# Patient Record
Sex: MALE | Race: BLACK OR AFRICAN AMERICAN | Employment: OTHER | URBAN - METROPOLITAN AREA
[De-identification: names, ages, dates, MRNs, and addresses within clinical notes are randomized per-mention and may not be internally consistent; named-entity substitution may affect disease eponyms.]

---

## 2019-01-30 ENCOUNTER — HOSPITAL ENCOUNTER (OUTPATIENT)
Dept: ULTRASOUND IMAGING | Age: 79
Discharge: HOME | End: 2019-01-30
Attending: FAMILY MEDICINE
Payer: MEDICARE

## 2019-01-30 DIAGNOSIS — N50.89 SCROTAL MASS: ICD-10-CM

## 2019-01-30 PROCEDURE — 76870 US EXAM SCROTUM: CPT

## 2021-10-04 RX ORDER — ACETAMINOPHEN 325 MG/1
650 TABLET ORAL ONCE
Status: ACTIVE | OUTPATIENT
Start: 2021-10-08 | End: 2021-10-08

## 2021-10-04 RX ORDER — DIPHENHYDRAMINE HCL 25 MG
25 CAPSULE ORAL ONCE
Status: ACTIVE | OUTPATIENT
Start: 2021-10-08 | End: 2021-10-08

## 2021-10-04 RX ORDER — SODIUM CHLORIDE 9 MG/ML
25 INJECTION, SOLUTION INTRAVENOUS CONTINUOUS
Status: DISPENSED | OUTPATIENT
Start: 2021-10-08 | End: 2021-10-08

## 2021-10-07 ENCOUNTER — APPOINTMENT (OUTPATIENT)
Dept: INFUSION THERAPY | Age: 81
End: 2021-10-07

## 2021-10-08 ENCOUNTER — HOSPITAL ENCOUNTER (OUTPATIENT)
Dept: INFUSION THERAPY | Age: 81
Discharge: HOME | End: 2021-10-08

## 2022-04-25 RX ORDER — DIPHENHYDRAMINE HCL 25 MG
25 CAPSULE ORAL ONCE
Status: DISCONTINUED | OUTPATIENT
Start: 2022-04-27 | End: 2022-04-27

## 2022-04-25 RX ORDER — ACETAMINOPHEN 325 MG/1
650 TABLET ORAL ONCE
Status: DISCONTINUED | OUTPATIENT
Start: 2022-04-27 | End: 2022-04-27

## 2022-04-27 ENCOUNTER — HOSPITAL ENCOUNTER (OUTPATIENT)
Dept: INFUSION THERAPY | Age: 82
Discharge: HOME | End: 2022-04-27
Payer: MEDICARE

## 2022-04-27 VITALS
TEMPERATURE: 97.8 F | HEART RATE: 87 BPM | DIASTOLIC BLOOD PRESSURE: 76 MMHG | OXYGEN SATURATION: 97 % | SYSTOLIC BLOOD PRESSURE: 146 MMHG | RESPIRATION RATE: 18 BRPM

## 2022-04-27 LAB — HISTORY CHECKED?,CKHIST: NORMAL

## 2022-04-27 PROCEDURE — 86923 COMPATIBILITY TEST ELECTRIC: CPT

## 2022-04-27 PROCEDURE — 86900 BLOOD TYPING SEROLOGIC ABO: CPT

## 2022-04-27 PROCEDURE — 36415 COLL VENOUS BLD VENIPUNCTURE: CPT

## 2022-04-27 RX ORDER — DIPHENHYDRAMINE HCL 25 MG
25 CAPSULE ORAL ONCE
Status: COMPLETED | OUTPATIENT
Start: 2022-04-29 | End: 2022-04-29

## 2022-04-27 RX ORDER — ACETAMINOPHEN 325 MG/1
650 TABLET ORAL ONCE
Status: COMPLETED | OUTPATIENT
Start: 2022-04-29 | End: 2022-04-29

## 2022-04-27 NOTE — PROGRESS NOTES
[facility] Progress Note    Date: 2022    Name: Geoffrey Perez    MRN: [id number]         : 1940      1035:  Pt arrived ambulatory and in no distress, for lab visit (Type and cross). Labs drawn via RAC, patient tolerated well. The patient was asked the following questions and answered as documented below:    1. Do you have any symptoms of COVID-19? SOB, coughing, fever, or generally not feeling well? NO  2. Have you been exposed to COVID-19 recently? NO  3. Have you had any recent contact with family/friend that has a pending COVID test? NO      Follow Up: Proceed with treatment    Visit Vitals  BP (!) 146/76 (BP 1 Location: Right arm, BP Patient Position: Sitting)   Pulse 87   Temp 97.8 °F (36.6 °C)   Resp 18   SpO2 97%       1055: Departed O[facility] ambulatory and in no distress.     Future Appointments   Date Time Provider Devon Abraham   2022 12:30 PM [facility] INFUSION NURSE 1 [address] [facility] SUSAN   2022 11:00 AM [facility] INFUSION NURSE 1 [address] [place] [address]       Mendel Coventry, RN  2022

## 2022-04-29 ENCOUNTER — HOSPITAL ENCOUNTER (OUTPATIENT)
Dept: INFUSION THERAPY | Age: 82
Discharge: HOME | End: 2022-04-29
Payer: MEDICARE

## 2022-04-29 VITALS
RESPIRATION RATE: 16 BRPM | HEART RATE: 81 BPM | SYSTOLIC BLOOD PRESSURE: 122 MMHG | HEIGHT: 66 IN | OXYGEN SATURATION: 100 % | WEIGHT: 204 LBS | BODY MASS INDEX: 32.78 KG/M2 | TEMPERATURE: 97.2 F | DIASTOLIC BLOOD PRESSURE: 50 MMHG

## 2022-04-29 PROCEDURE — P9016 RBC LEUKOCYTES REDUCED: HCPCS

## 2022-04-29 PROCEDURE — 36430 TRANSFUSION BLD/BLD COMPNT: CPT

## 2022-04-29 PROCEDURE — 74011250637 HC RX REV CODE- 250/637: Performed by: INTERNAL MEDICINE

## 2022-04-29 PROCEDURE — 74011250636 HC RX REV CODE- 250/636: Performed by: INTERNAL MEDICINE

## 2022-04-29 PROCEDURE — 77030013169 SET IV BLD ICUM -A

## 2022-04-29 PROCEDURE — 74011000250 HC RX REV CODE- 250: Performed by: INTERNAL MEDICINE

## 2022-04-29 RX ORDER — SODIUM CHLORIDE 0.9 % (FLUSH) 0.9 %
5-10 SYRINGE (ML) INJECTION AS NEEDED
Status: DISCONTINUED | OUTPATIENT
Start: 2022-04-29 | End: 2022-04-30 | Stop reason: HOSPADM

## 2022-04-29 RX ORDER — SODIUM CHLORIDE 9 MG/ML
250 INJECTION, SOLUTION INTRAVENOUS AS NEEDED
Status: DISCONTINUED | OUTPATIENT
Start: 2022-04-29 | End: 2022-05-01 | Stop reason: HOSPADM

## 2022-04-29 RX ADMIN — Medication 10 ML: at 14:12

## 2022-04-29 RX ADMIN — DIPHENHYDRAMINE HYDROCHLORIDE 25 MG: 25 CAPSULE ORAL at 11:10

## 2022-04-29 RX ADMIN — ACETAMINOPHEN 650 MG: 325 TABLET ORAL at 11:10

## 2022-04-29 RX ADMIN — Medication 10 ML: at 11:06

## 2022-04-29 RX ADMIN — SODIUM CHLORIDE 250 ML: 9 INJECTION, SOLUTION INTRAVENOUS at 11:08

## 2022-04-29 NOTE — DISCHARGE INSTRUCTIONS
OUTPATIENT INFUSION CENTER    DISCHARGE INSTRUCTIONS FOR:  BLOOD TRANSFUSION    We hope you are feeling better after your blood transfusion. Some mild tenderness or slight bruising at your IV site is normal.  Avoid lifting or heavy use of that extremity for the rest of the day. Drink plenty of fluids, eat a normal diet and get some rest.    There are some important signs that you need to watch for in case you experience a delayed reaction to the blood you have received. Call your physician immediately if you develop any of the following symptoms:    1. Severe headache or backache;    2. Fever above 100 degrees;    3. Chills;    4. Difficulty breathing;    5.  Blood or red color in urine;    6. The feeling of weakness or constant fatigue;    7. Yellowing of the whites of your eyes or skin (jaundice). If your physician is not available, call or go to the nearest emergency room, or dial 911.     Anna Marie Pablo, Signature: ___________________________ 4/29/2022  Lisa Lofton RN

## 2022-04-29 NOTE — PROGRESS NOTES
[facility] Progress Note  April 29, 2022      [address] arrived ambulatory and in no acute distress for 1 unit PRBCs. Patient COVID Screening completed:   Do you have any symptoms of COVID-19? SOB, coughing, fever, or generally not feeling well? NO   Have you been exposed to COVID-19 recently? NO   Have you had any recent contact with family/friend that has a pending COVID test? NO    Assessment completed, no new complaints voiced. 22G PIV established in R A/C without difficulty. Education provided regarding reason for blood transfusion and possible reactions. All questions and concerns regarding transfusion answered, patient voiced understanding. Problem: Anemia Care Plan (Adult and Pediatric)  Goal: *Labs within defined limits  Outcome: Progressing Towards Goal     Problem: Knowledge Deficit  Goal: *Verbalizes understanding of procedures and medications  Outcome: Progressing Towards Goal     Problem: Patient Education:  Go to Education Activity  Goal: Patient/Family Education  Outcome: Progressing Towards Goal      Medications received:  NS @ KVO  Tylenol 650 mg PO  Benadryl 25 mg PO    1130:  1st unit PRBCs started and infusing without difficulty, observed x 15 minutes  1350:  1st unit completed without adverse reaction noted, NS flushing line. Patient Vitals for the past 12 hrs:   Temp Pulse Resp BP SpO2   04/29/22 1410 97.2 °F (36.2 °C) 81 16 (!) 122/50    04/29/22 1330 97.3 °F (36.3 °C) 83 16 (!) 124/51    04/29/22 1230 97.2 °F (36.2 °C) 79 16 (!) 119/52    04/29/22 1200 97.1 °F (36.2 °C) 79 16 (!) 118/50    04/29/22 1145 97.3 °F (36.3 °C) (!) 52 18 (!) 111/35 100 %   04/29/22 1125 97.1 °F (36.2 °C) 81 18 (!) 123/56 98 %   04/29/22 1050 97 °F (36.1 °C) 85 18 (!) 119/58 100 %       1415 Patient tolerated transfusion  well, no adverse reaction noted. D/C instructions reviewed, copy to pt, voiced understanding. Patient declined 1 hour post transfusion observation/vitals signs. PIV flushed and removed, 2x2 and coban placed. D/Cd from [facility] ambulatory and in no distress accompanied by self. No future appointments.     Tashi Grier RN  April 29, 2022

## 2022-05-01 LAB
ABO + RH BLD: NORMAL
BLD PROD TYP BPU: NORMAL
BLOOD GROUP ANTIBODIES SERPL: NORMAL
BPU ID: NORMAL
CROSSMATCH RESULT,%XM: NORMAL
SPECIMEN EXP DATE BLD: NORMAL
STATUS OF UNIT,%ST: NORMAL
UNIT DIVISION, %UDIV: 0

## 2022-06-13 ENCOUNTER — HOSPITAL ENCOUNTER (OUTPATIENT)
Dept: INFUSION THERAPY | Age: 82
Discharge: HOME | End: 2022-06-13
Payer: MEDICARE

## 2022-06-13 VITALS
TEMPERATURE: 98 F | RESPIRATION RATE: 18 BRPM | DIASTOLIC BLOOD PRESSURE: 63 MMHG | HEART RATE: 84 BPM | SYSTOLIC BLOOD PRESSURE: 119 MMHG

## 2022-06-13 LAB — HISTORY CHECKED?,CKHIST: NORMAL

## 2022-06-13 PROCEDURE — 86920 COMPATIBILITY TEST SPIN: CPT

## 2022-06-13 PROCEDURE — 36415 COLL VENOUS BLD VENIPUNCTURE: CPT

## 2022-06-13 PROCEDURE — 86900 BLOOD TYPING SEROLOGIC ABO: CPT

## 2022-06-13 RX ORDER — SODIUM CHLORIDE 9 MG/ML
250 INJECTION, SOLUTION INTRAVENOUS AS NEEDED
Status: DISCONTINUED | OUTPATIENT
Start: 2022-06-13 | End: 2022-06-14 | Stop reason: HOSPADM

## 2022-06-13 NOTE — PROGRESS NOTES
[address] [facility] [address] @ [facility] VISIT NOTE    1500 Patient arrives for Type & Cross without acute problems. Please see connect care for complete assessment and education provided. Vital signs stable throughout and prior to discharge, Pt. Tolerated treatment well and discharged without incident. Patient is aware of next [facility] appointment on 6/14/2022. Appointment card given to patient. VITAL SIGNS Patient Vitals for the past 12 hrs:   Temp Pulse Resp BP   06/13/22 1502 98 °F (36.7 °C) 84 18 119/63       LAB WORK Lab results pending, please see Connect Care for results. Recent Results (from the past 12 hour(s))   RBC, ALLOCATE    Collection Time: 06/13/22  3:00 PM   Result Value Ref Range    HISTORY CHECKED?  Historical check performed

## 2022-06-14 ENCOUNTER — HOSPITAL ENCOUNTER (OUTPATIENT)
Dept: INFUSION THERAPY | Age: 82
Discharge: HOME | End: 2022-06-14
Payer: MEDICARE

## 2022-06-14 VITALS
HEART RATE: 73 BPM | SYSTOLIC BLOOD PRESSURE: 110 MMHG | RESPIRATION RATE: 18 BRPM | TEMPERATURE: 97.7 F | DIASTOLIC BLOOD PRESSURE: 59 MMHG

## 2022-06-14 PROCEDURE — 74011250636 HC RX REV CODE- 250/636: Performed by: NURSE PRACTITIONER

## 2022-06-14 PROCEDURE — P9016 RBC LEUKOCYTES REDUCED: HCPCS

## 2022-06-14 PROCEDURE — 86922 COMPATIBILITY TEST ANTIGLOB: CPT

## 2022-06-14 PROCEDURE — 36430 TRANSFUSION BLD/BLD COMPNT: CPT

## 2022-06-14 PROCEDURE — 86921 COMPATIBILITY TEST INCUBATE: CPT

## 2022-06-14 PROCEDURE — 77030013169 SET IV BLD ICUM -A

## 2022-06-14 PROCEDURE — P9073 PLATELETS PHERESIS PATH REDU: HCPCS

## 2022-06-14 PROCEDURE — 74011250637 HC RX REV CODE- 250/637: Performed by: NURSE PRACTITIONER

## 2022-06-14 RX ORDER — AMOXICILLIN 250 MG
1 CAPSULE ORAL 2 TIMES DAILY
COMMUNITY

## 2022-06-14 RX ORDER — POLYETHYLENE GLYCOL 3350 17 G/17G
17 POWDER, FOR SOLUTION ORAL 2 TIMES DAILY
COMMUNITY

## 2022-06-14 RX ORDER — ACETAMINOPHEN 325 MG/1
650 TABLET ORAL ONCE
Status: COMPLETED | OUTPATIENT
Start: 2022-06-14 | End: 2022-06-14

## 2022-06-14 RX ORDER — SODIUM CHLORIDE 9 MG/ML
250 INJECTION, SOLUTION INTRAVENOUS AS NEEDED
Status: DISCONTINUED | OUTPATIENT
Start: 2022-06-14 | End: 2022-06-15 | Stop reason: HOSPADM

## 2022-06-14 RX ORDER — DIPHENHYDRAMINE HCL 25 MG
25 CAPSULE ORAL ONCE
Status: COMPLETED | OUTPATIENT
Start: 2022-06-14 | End: 2022-06-14

## 2022-06-14 RX ORDER — SODIUM CHLORIDE 9 MG/ML
25 INJECTION, SOLUTION INTRAVENOUS CONTINUOUS
Status: DISCONTINUED | OUTPATIENT
Start: 2022-06-14 | End: 2022-06-15 | Stop reason: HOSPADM

## 2022-06-14 RX ADMIN — DIPHENHYDRAMINE HYDROCHLORIDE 25 MG: 25 CAPSULE ORAL at 09:04

## 2022-06-14 RX ADMIN — ACETAMINOPHEN 650 MG: 325 TABLET ORAL at 09:04

## 2022-06-14 RX ADMIN — SODIUM CHLORIDE 25 ML/HR: 900 INJECTION, SOLUTION INTRAVENOUS at 09:00

## 2022-06-14 NOTE — PROGRESS NOTES
[facility] Peds/Adult Note                       Date: 2022    Name: Erin Schafer    MRN: [id number]         : 1940    0840 Patient arrives for Platelets and PRBCs transfusion without acute problems. Please see [facility] for complete assessment and education provided. Prior to treatment, patient was screened for COVID 19. Patient denies any signs or symptoms of COVID. Denies any known physical contact with anyone diagnosed with, or with pending or positive COVID test. Denies a pending or positive COVID test himself. Vital signs stable throughout and prior to discharge. Patient tolerated procedure well and was discharged without incident. Patient is aware of no further [facility] appointments and to follow up with referring provider for any questions or concerns. Mr. Haylee Barbosa vitals were reviewed prior to and after treatment. Patient Vitals for the past 12 hrs:   Temp Pulse Resp BP   22 1341 97.7 °F (36.5 °C) 73 18 (!) 110/59   22 1115 98 °F (36.7 °C) 80 18 (!) 109/54   22 1053 97.5 °F (36.4 °C) 83 18 (!) 108/59   22 1044 98.4 °F (36.9 °C) 80 18 (!) 101/55   22 1020 97.5 °F (36.4 °C) 81 18 (!) 100/53   22 0950 97.7 °F (36.5 °C) 85 16 123/76   22 0839 98 °F (36.7 °C) 96 18 115/65         Lab results were obtained and reviewed. No results found for this or any previous visit (from the past 12 hour(s)).     Medications given:   Medications Administered     0.9% sodium chloride infusion     Admin Date  2022 Action  New Bag Dose  25 mL/hr Rate  25 mL/hr Route  IntraVENous Administered By  Gurpreet Maurer RN          acetaminophen (TYLENOL) tablet 650 mg     Admin Date  2022 Action  Given Dose  650 mg Route  Oral Administered By  Gurpreet Maurer RN          diphenhydrAMINE (BENADRYL) capsule 25 mg     Admin Date  2022 Action  Given Dose  25 mg Route  Oral Administered By  Gurpreet Maurer RN              Patient was given 1 unit of platelets and one unit of packed red blood cells per protocol. Mr. Anna Haynes tolerated the transfusions, and had no complaints. Mr. Anna Haynes was discharged from [place] [address] in stable condition. Discharge Instructions provided to patient, patient verbalized understanding and was given a copy of d/c instructions. No future appointments.     Jud Kowalski RN  June 14, 2022  3:32 PM

## 2022-06-15 LAB
ABO + RH BLD: NORMAL
BLD PROD TYP BPU: NORMAL
BLD PROD TYP BPU: NORMAL
BLOOD GROUP ANTIBODIES SERPL: NORMAL
BPU ID: NORMAL
BPU ID: NORMAL
CROSSMATCH RESULT,%XM: NORMAL
SPECIMEN EXP DATE BLD: NORMAL
STATUS OF UNIT,%ST: NORMAL
STATUS OF UNIT,%ST: NORMAL
UNIT DIVISION, %UDIV: 0
UNIT DIVISION, %UDIV: 0

## 2022-07-08 ENCOUNTER — HOSPITAL ENCOUNTER (OUTPATIENT)
Dept: INFUSION THERAPY | Age: 82
Discharge: HOME | End: 2022-07-08
Payer: MEDICARE

## 2022-07-08 VITALS
SYSTOLIC BLOOD PRESSURE: 114 MMHG | OXYGEN SATURATION: 98 % | TEMPERATURE: 97.8 F | HEART RATE: 67 BPM | RESPIRATION RATE: 16 BRPM | DIASTOLIC BLOOD PRESSURE: 42 MMHG

## 2022-07-08 LAB — HISTORY CHECKED?,CKHIST: NORMAL

## 2022-07-08 PROCEDURE — P9016 RBC LEUKOCYTES REDUCED: HCPCS

## 2022-07-08 PROCEDURE — 36430 TRANSFUSION BLD/BLD COMPNT: CPT

## 2022-07-08 PROCEDURE — 86900 BLOOD TYPING SEROLOGIC ABO: CPT

## 2022-07-08 PROCEDURE — 86922 COMPATIBILITY TEST ANTIGLOB: CPT

## 2022-07-08 PROCEDURE — 86920 COMPATIBILITY TEST SPIN: CPT

## 2022-07-08 PROCEDURE — 36415 COLL VENOUS BLD VENIPUNCTURE: CPT

## 2022-07-08 PROCEDURE — 86921 COMPATIBILITY TEST INCUBATE: CPT

## 2022-07-08 RX ORDER — SODIUM CHLORIDE 9 MG/ML
250 INJECTION, SOLUTION INTRAVENOUS AS NEEDED
Status: DISCONTINUED | OUTPATIENT
Start: 2022-07-08 | End: 2022-07-09 | Stop reason: HOSPADM

## 2022-07-08 NOTE — PROGRESS NOTES
[facility] [place] Progress Note    Date: 2022    Name: Leatha Peña    MRN: [id number]         : 1940      0825 Pt arrived at [place] and in no distress for transfusion of 1 units of PRBC. Assessment completed, no new complaints voiced. IV established in the left antecubital without difficulty. Signs/symptoms of adverse blood reaction discussed with pt, voiced understanding. 1040:  1st unit PRBCs started and infusing without difficulty, observed x 15 minutes    1300:  1st unit completed without adverse reaction noted, NS flushing line. Patient Vitals for the past 12 hrs:   Temp Pulse Resp BP SpO2   22 1300 97.8 °F (36.6 °C) 67 16 (!) 114/42 --   22 1055 97.9 °F (36.6 °C) 73 16 (!) 114/43 --   22 1040 97.7 °F (36.5 °C) 72 18 (!) 106/50 --   22 0822 97.7 °F (36.5 °C) 88 18 (!) 116/45 98 %       1310 Tolerated transfusion  well, no adverse reaction noted. Patient declined 1 hour post transfusion observation. D/Cd from [place] in no distress.       Brenna Ralph RN  2022

## 2022-07-09 LAB
ABO + RH BLD: NORMAL
BLD PROD TYP BPU: NORMAL
BLOOD BANK CMNT PATIENT-IMP: NORMAL
BLOOD GROUP ANTIBODIES SERPL: NORMAL
BPU ID: NORMAL
CROSSMATCH RESULT,%XM: NORMAL
SPECIMEN EXP DATE BLD: NORMAL
STATUS OF UNIT,%ST: NORMAL
UNIT DIVISION, %UDIV: 0

## 2022-07-14 ENCOUNTER — HOSPITAL ENCOUNTER (OUTPATIENT)
Dept: INFUSION THERAPY | Age: 82
Discharge: HOME | End: 2022-07-14
Payer: MEDICARE

## 2022-07-14 VITALS — TEMPERATURE: 99.1 F | SYSTOLIC BLOOD PRESSURE: 123 MMHG | DIASTOLIC BLOOD PRESSURE: 63 MMHG | HEART RATE: 110 BPM

## 2022-07-14 LAB — HISTORY CHECKED?,CKHIST: NORMAL

## 2022-07-14 PROCEDURE — 86922 COMPATIBILITY TEST ANTIGLOB: CPT

## 2022-07-14 PROCEDURE — 36415 COLL VENOUS BLD VENIPUNCTURE: CPT

## 2022-07-14 PROCEDURE — 86920 COMPATIBILITY TEST SPIN: CPT

## 2022-07-14 PROCEDURE — 86900 BLOOD TYPING SEROLOGIC ABO: CPT

## 2022-07-14 PROCEDURE — 86921 COMPATIBILITY TEST INCUBATE: CPT

## 2022-07-14 RX ORDER — SODIUM CHLORIDE 9 MG/ML
250 INJECTION, SOLUTION INTRAVENOUS AS NEEDED
Status: DISCONTINUED | OUTPATIENT
Start: 2022-07-14 | End: 2022-07-15 | Stop reason: HOSPADM

## 2022-07-14 NOTE — PROGRESS NOTES
[facility] Lab visit:     0536: Patient arrived ambulatory and in no distress. Labs drawn per Khris Nair RN. Departed [facility] ambulatory and in no distress. Visit Vitals:  Visit Vitals  /63 (BP 1 Location: Left upper arm, BP Patient Position: Sitting)   Pulse (!) 110   Temp 99.1 °F (37.3 °C)       Labs: See CC for pending results.

## 2022-07-15 ENCOUNTER — APPOINTMENT (OUTPATIENT)
Dept: GENERAL RADIOLOGY | Age: 82
DRG: 871 | End: 2022-07-15
Attending: PHYSICIAN ASSISTANT
Payer: MEDICARE

## 2022-07-15 ENCOUNTER — HOSPITAL ENCOUNTER (INPATIENT)
Age: 82
LOS: 4 days | Discharge: HOME | DRG: 871 | End: 2022-07-19
Attending: EMERGENCY MEDICINE | Admitting: FAMILY MEDICINE
Payer: MEDICARE

## 2022-07-15 ENCOUNTER — HOSPITAL ENCOUNTER (OUTPATIENT)
Dept: INFUSION THERAPY | Age: 82
Discharge: HOME | End: 2022-07-15
Payer: MEDICARE

## 2022-07-15 VITALS
SYSTOLIC BLOOD PRESSURE: 122 MMHG | HEART RATE: 105 BPM | TEMPERATURE: 98.7 F | DIASTOLIC BLOOD PRESSURE: 65 MMHG | RESPIRATION RATE: 20 BRPM

## 2022-07-15 DIAGNOSIS — D70.9 NEUTROPENIC FEVER (HCC): Primary | ICD-10-CM

## 2022-07-15 DIAGNOSIS — R50.81 NEUTROPENIC FEVER (HCC): Primary | ICD-10-CM

## 2022-07-15 LAB
ALBUMIN SERPL-MCNC: 3.6 G/DL (ref 3.5–5)
ALBUMIN/GLOB SERPL: 0.8 {RATIO} (ref 1.1–2.2)
ALP SERPL-CCNC: 103 U/L (ref 45–117)
ALT SERPL-CCNC: 12 U/L (ref 12–78)
ANION GAP SERPL CALC-SCNC: 5 MMOL/L (ref 5–15)
AST SERPL-CCNC: 8 U/L (ref 15–37)
BILIRUB SERPL-MCNC: 2.7 MG/DL (ref 0.2–1)
BUN SERPL-MCNC: 16 MG/DL (ref 6–20)
BUN/CREAT SERPL: 18 (ref 12–20)
CALCIUM SERPL-MCNC: 9.1 MG/DL (ref 8.5–10.1)
CHLORIDE SERPL-SCNC: 101 MMOL/L (ref 97–108)
CO2 SERPL-SCNC: 27 MMOL/L (ref 21–32)
COMMENT, HOLDF: NORMAL
COVID-19 RAPID TEST, COVR: NOT DETECTED
CREAT SERPL-MCNC: 0.9 MG/DL (ref 0.7–1.3)
GLOBULIN SER CALC-MCNC: 4.3 G/DL (ref 2–4)
GLUCOSE SERPL-MCNC: 123 MG/DL (ref 65–100)
LACTATE BLD-SCNC: 1.67 MMOL/L (ref 0.4–2)
LACTATE SERPL-SCNC: 1 MMOL/L (ref 0.4–2)
LDH SERPL L TO P-CCNC: 177 U/L (ref 85–241)
POTASSIUM SERPL-SCNC: 3.7 MMOL/L (ref 3.5–5.1)
PROCALCITONIN SERPL-MCNC: 0.1 NG/ML
PROT SERPL-MCNC: 7.9 G/DL (ref 6.4–8.2)
SAMPLES BEING HELD,HOLD: NORMAL
SODIUM SERPL-SCNC: 133 MMOL/L (ref 136–145)
SOURCE, COVRS: NORMAL

## 2022-07-15 PROCEDURE — 84145 PROCALCITONIN (PCT): CPT

## 2022-07-15 PROCEDURE — 86900 BLOOD TYPING SEROLOGIC ABO: CPT

## 2022-07-15 PROCEDURE — 74011250637 HC RX REV CODE- 250/637: Performed by: INTERNAL MEDICINE

## 2022-07-15 PROCEDURE — 96368 THER/DIAG CONCURRENT INF: CPT

## 2022-07-15 PROCEDURE — 86920 COMPATIBILITY TEST SPIN: CPT

## 2022-07-15 PROCEDURE — 36415 COLL VENOUS BLD VENIPUNCTURE: CPT

## 2022-07-15 PROCEDURE — 83605 ASSAY OF LACTIC ACID: CPT

## 2022-07-15 PROCEDURE — 80053 COMPREHEN METABOLIC PANEL: CPT

## 2022-07-15 PROCEDURE — 96375 TX/PRO/DX INJ NEW DRUG ADDON: CPT

## 2022-07-15 PROCEDURE — 85025 COMPLETE CBC W/AUTO DIFF WBC: CPT

## 2022-07-15 PROCEDURE — 82728 ASSAY OF FERRITIN: CPT

## 2022-07-15 PROCEDURE — 87040 BLOOD CULTURE FOR BACTERIA: CPT

## 2022-07-15 PROCEDURE — 99285 EMERGENCY DEPT VISIT HI MDM: CPT

## 2022-07-15 PROCEDURE — 74011250636 HC RX REV CODE- 250/636: Performed by: PHYSICIAN ASSISTANT

## 2022-07-15 PROCEDURE — 84484 ASSAY OF TROPONIN QUANT: CPT

## 2022-07-15 PROCEDURE — 74011250636 HC RX REV CODE- 250/636: Performed by: INTERNAL MEDICINE

## 2022-07-15 PROCEDURE — 74011000250 HC RX REV CODE- 250: Performed by: EMERGENCY MEDICINE

## 2022-07-15 PROCEDURE — P9016 RBC LEUKOCYTES REDUCED: HCPCS

## 2022-07-15 PROCEDURE — 93005 ELECTROCARDIOGRAM TRACING: CPT

## 2022-07-15 PROCEDURE — 87635 SARS-COV-2 COVID-19 AMP PRB: CPT

## 2022-07-15 PROCEDURE — 65270000046 HC RM TELEMETRY

## 2022-07-15 PROCEDURE — 83615 LACTATE (LD) (LDH) ENZYME: CPT

## 2022-07-15 PROCEDURE — 36430 TRANSFUSION BLD/BLD COMPNT: CPT

## 2022-07-15 PROCEDURE — 83540 ASSAY OF IRON: CPT

## 2022-07-15 PROCEDURE — 71045 X-RAY EXAM CHEST 1 VIEW: CPT

## 2022-07-15 PROCEDURE — 82607 VITAMIN B-12: CPT

## 2022-07-15 PROCEDURE — 74011250636 HC RX REV CODE- 250/636: Performed by: EMERGENCY MEDICINE

## 2022-07-15 PROCEDURE — 82746 ASSAY OF FOLIC ACID SERUM: CPT

## 2022-07-15 RX ORDER — SODIUM CHLORIDE 9 MG/ML
250 INJECTION, SOLUTION INTRAVENOUS AS NEEDED
Status: DISCONTINUED | OUTPATIENT
Start: 2022-07-15 | End: 2022-07-17 | Stop reason: HOSPADM

## 2022-07-15 RX ORDER — SODIUM CHLORIDE 9 MG/ML
25 INJECTION, SOLUTION INTRAVENOUS CONTINUOUS
Status: DISCONTINUED | OUTPATIENT
Start: 2022-07-15 | End: 2022-07-17 | Stop reason: HOSPADM

## 2022-07-15 RX ORDER — SODIUM CHLORIDE 0.9 % (FLUSH) 0.9 %
5-10 SYRINGE (ML) INJECTION AS NEEDED
Status: DISCONTINUED | OUTPATIENT
Start: 2022-07-15 | End: 2022-07-19 | Stop reason: HOSPADM

## 2022-07-15 RX ORDER — ACETAMINOPHEN 325 MG/1
650 TABLET ORAL ONCE
Status: COMPLETED | OUTPATIENT
Start: 2022-07-15 | End: 2022-07-15

## 2022-07-15 RX ORDER — DIPHENHYDRAMINE HCL 25 MG
25 CAPSULE ORAL ONCE
Status: COMPLETED | OUTPATIENT
Start: 2022-07-15 | End: 2022-07-15

## 2022-07-15 RX ORDER — VANCOMYCIN 2 GRAM/500 ML IN 0.9 % SODIUM CHLORIDE INTRAVENOUS
2000 ONCE
Status: COMPLETED | OUTPATIENT
Start: 2022-07-15 | End: 2022-07-16

## 2022-07-15 RX ORDER — LEVOFLOXACIN 5 MG/ML
750 INJECTION, SOLUTION INTRAVENOUS
Status: COMPLETED | OUTPATIENT
Start: 2022-07-15 | End: 2022-07-16

## 2022-07-15 RX ORDER — ACETAMINOPHEN 500 MG
1000 TABLET ORAL
Status: DISCONTINUED | OUTPATIENT
Start: 2022-07-15 | End: 2022-07-19 | Stop reason: HOSPADM

## 2022-07-15 RX ADMIN — DIPHENHYDRAMINE HYDROCHLORIDE 25 MG: 25 CAPSULE ORAL at 10:26

## 2022-07-15 RX ADMIN — SODIUM CHLORIDE 25 ML/HR: 9 INJECTION, SOLUTION INTRAVENOUS at 10:27

## 2022-07-15 RX ADMIN — ACETAMINOPHEN 1000 MG: 500 TABLET ORAL at 23:29

## 2022-07-15 RX ADMIN — ACETAMINOPHEN 650 MG: 325 TABLET ORAL at 10:26

## 2022-07-15 RX ADMIN — SODIUM CHLORIDE, PRESERVATIVE FREE 2 G: 5 INJECTION INTRAVENOUS at 23:32

## 2022-07-15 RX ADMIN — SODIUM CHLORIDE 1000 ML: 9 INJECTION, SOLUTION INTRAVENOUS at 23:31

## 2022-07-15 RX ADMIN — LEVOFLOXACIN 750 MG: 750 INJECTION, SOLUTION INTRAVENOUS at 23:36

## 2022-07-15 NOTE — PROGRESS NOTES
[address] [facility] @ [facility] VISIT NOTE     4547: Patient arrives for PRBC transfusion without acute problems. Please see connect [facility] for complete assessment and education provided. Vital signs stable throughout and prior to discharge, Pt. Tolerated treatment well and discharged without incident. Patient is aware to follow up with provider for future appointment. The patient/parent denies any symptoms of COVID (SOB, coughing, fever, or generally not feeling well), denies any known exposure to COVID-19 recently, and denies any known recent contact with family/friend that has a pending COVID test.      Medications Verified by Beckie Kathleen  Medications Administered     0.9% sodium chloride infusion     Admin Date  07/15/2022 Action  New Bag Dose  25 mL/hr Rate  25 mL/hr Route  IntraVENous Administered By  Chere Bamberger, RN          acetaminophen (TYLENOL) tablet 650 mg     Admin Date  07/15/2022 Action  Given Dose  650 mg Route  Oral Administered By  Chere Bamberger, RN          diphenhydrAMINE (BENADRYL) capsule 25 mg     Admin Date  07/15/2022 Action  Given Dose  25 mg Route  Oral Administered By  Chere Bamberger, RN               VITAL SIGNS   Patient Vitals for the past 12 hrs:   Temp Pulse Resp BP   07/15/22 1315 98.7 °F (37.1 °C) (!) 105 20 122/65   07/15/22 1115 97.7 °F (36.5 °C) 96 18 119/69   07/15/22 1055 98 °F (36.7 °C) 98 20 101/60   07/15/22 0955 98.2 °F (36.8 °C) (!) 105 -- (!) 107/57     Labs obtained 07/14/15.

## 2022-07-15 NOTE — Clinical Note
Status[de-identified] INPATIENT [101]   Type of Bed: Telemetry [19]   Cardiac Monitoring Required?: Yes   Inpatient Hospitalization Certified Necessary for the Following Reasons: 3.  Patient receiving treatment that can only be provided in an inpatient setting (further clarification in H&P documentation)   Admitting Diagnosis: Neutropenic fever [facility]) [[id number]]   Admitting Physician: Jose Maria Roberts [[id number]]   Attending Physician: Jose Maria Roberts [[id number]]   Estimated Length of Stay: 3-4 Midnights   Discharge Plan[de-identified] Home with Office Follow-up

## 2022-07-15 NOTE — ED NOTES
[de-identified] male, hx of cancerhere c/o fatigue, malaise. Received a RBC infusion this morning, tolerated well. The past few hours has had generalized malaise, and chills which he has never had with other blood transfusions. . Denies CP.    5:46 PM  I have evaluated the patient as the Provider in Triage. I have reviewed His vital signs and the triage nurse assessment. I have talked with the patient and any available family and advised that I am the provider in triage and have ordered the appropriate study to initiate their work up based on the clinical presentation during my assessment. I have advised that the patient will be accommodated in the Main ED as soon as possible. I have also requested to contact the triage nurse or myself immediately if the patient experiences any changes in their condition during this brief waiting period.   Taylor Bain PA-C

## 2022-07-15 NOTE — ED TRIAGE NOTES
Pt received a RBC infusion today upstairs and was told to come to ED for chills or fevers. Pt was discharged at 1:34 today. Pt reports having chills. Decreased appetite. Pt denies black/tary stool. Pt reports going to [facility] for infusions. Pt denies chest pain, denies SOB.  Denies n/v.

## 2022-07-16 ENCOUNTER — HOSPITAL ENCOUNTER (INPATIENT)
Dept: CT IMAGING | Age: 82
Discharge: HOME | DRG: 871 | End: 2022-07-16
Attending: INTERNAL MEDICINE
Payer: MEDICARE

## 2022-07-16 LAB
ABO + RH BLD: NORMAL
ANION GAP SERPL CALC-SCNC: 5 MMOL/L (ref 5–15)
ATRIAL RATE: 128 BPM
B PERT DNA SPEC QL NAA+PROBE: NOT DETECTED
BASOPHILS # BLD: 0 K/UL (ref 0–0.1)
BASOPHILS # BLD: 0 K/UL (ref 0–0.1)
BASOPHILS NFR BLD: 0 % (ref 0–1)
BASOPHILS NFR BLD: 0 % (ref 0–1)
BLD PROD TYP BPU: NORMAL
BLOOD BANK CMNT PATIENT-IMP: NORMAL
BLOOD GROUP ANTIBODIES SERPL: NORMAL
BORDETELLA PARAPERTUSSIS PCR, BORPAR: NOT DETECTED
BPU ID: NORMAL
BUN SERPL-MCNC: 14 MG/DL (ref 6–20)
BUN/CREAT SERPL: 16 (ref 12–20)
C PNEUM DNA SPEC QL NAA+PROBE: NOT DETECTED
CALCIUM SERPL-MCNC: 8.3 MG/DL (ref 8.5–10.1)
CALCULATED P AXIS, ECG09: 6 DEGREES
CALCULATED R AXIS, ECG10: -85 DEGREES
CALCULATED T AXIS, ECG11: 61 DEGREES
CHLORIDE SERPL-SCNC: 103 MMOL/L (ref 97–108)
CO2 SERPL-SCNC: 28 MMOL/L (ref 21–32)
CREAT SERPL-MCNC: 0.87 MG/DL (ref 0.7–1.3)
CROSSMATCH RESULT,%XM: NORMAL
DIAGNOSIS, 93000: NORMAL
DIFFERENTIAL METHOD BLD: ABNORMAL
DIFFERENTIAL METHOD BLD: ABNORMAL
EOSINOPHIL # BLD: 0 K/UL (ref 0–0.4)
EOSINOPHIL # BLD: 0 K/UL (ref 0–0.4)
EOSINOPHIL NFR BLD: 2 % (ref 0–7)
EOSINOPHIL NFR BLD: 2 % (ref 0–7)
ERYTHROCYTE [DISTWIDTH] IN BLOOD BY AUTOMATED COUNT: 18.2 % (ref 11.5–14.5)
ERYTHROCYTE [DISTWIDTH] IN BLOOD BY AUTOMATED COUNT: 18.5 % (ref 11.5–14.5)
FERRITIN SERPL-MCNC: 3336 NG/ML (ref 26–388)
FLUAV H1 2009 PAND RNA SPEC QL NAA+PROBE: NOT DETECTED
FLUAV H1 RNA SPEC QL NAA+PROBE: NOT DETECTED
FLUAV H3 RNA SPEC QL NAA+PROBE: NOT DETECTED
FLUAV SUBTYP SPEC NAA+PROBE: NOT DETECTED
FLUBV RNA SPEC QL NAA+PROBE: NOT DETECTED
FOLATE SERPL-MCNC: 12.8 NG/ML (ref 5–21)
FOLATE SERPL-MCNC: 13.1 NG/ML (ref 5–21)
GLUCOSE SERPL-MCNC: 111 MG/DL (ref 65–100)
HADV DNA SPEC QL NAA+PROBE: NOT DETECTED
HCOV 229E RNA SPEC QL NAA+PROBE: NOT DETECTED
HCOV HKU1 RNA SPEC QL NAA+PROBE: NOT DETECTED
HCOV NL63 RNA SPEC QL NAA+PROBE: NOT DETECTED
HCOV OC43 RNA SPEC QL NAA+PROBE: NOT DETECTED
HCT VFR BLD AUTO: 18.9 % (ref 36.6–50.3)
HCT VFR BLD AUTO: 22.6 % (ref 36.6–50.3)
HGB BLD-MCNC: 6.4 G/DL (ref 12.1–17)
HGB BLD-MCNC: 7.8 G/DL (ref 12.1–17)
HISTORY CHECKED?,CKHIST: NORMAL
HMPV RNA SPEC QL NAA+PROBE: NOT DETECTED
HPIV1 RNA SPEC QL NAA+PROBE: NOT DETECTED
HPIV2 RNA SPEC QL NAA+PROBE: NOT DETECTED
HPIV3 RNA SPEC QL NAA+PROBE: NOT DETECTED
HPIV4 RNA SPEC QL NAA+PROBE: NOT DETECTED
IMM GRANULOCYTES # BLD AUTO: 0 K/UL
IMM GRANULOCYTES # BLD AUTO: 0 K/UL
IMM GRANULOCYTES NFR BLD AUTO: 0 %
IMM GRANULOCYTES NFR BLD AUTO: 0 %
IRON SATN MFR SERPL: 55 % (ref 20–50)
IRON SERPL-MCNC: 110 UG/DL (ref 35–150)
LYMPHOCYTES # BLD: 0.4 K/UL (ref 0.8–3.5)
LYMPHOCYTES # BLD: 0.8 K/UL (ref 0.8–3.5)
LYMPHOCYTES NFR BLD: 42 % (ref 12–49)
LYMPHOCYTES NFR BLD: 65 % (ref 12–49)
M PNEUMO DNA SPEC QL NAA+PROBE: NOT DETECTED
MCH RBC QN AUTO: 26.9 PG (ref 26–34)
MCH RBC QN AUTO: 27.7 PG (ref 26–34)
MCHC RBC AUTO-ENTMCNC: 33.9 G/DL (ref 30–36.5)
MCHC RBC AUTO-ENTMCNC: 34.5 G/DL (ref 30–36.5)
MCV RBC AUTO: 79.4 FL (ref 80–99)
MCV RBC AUTO: 80.1 FL (ref 80–99)
MONOCYTES # BLD: 0.2 K/UL (ref 0–1)
MONOCYTES # BLD: 0.3 K/UL (ref 0–1)
MONOCYTES NFR BLD: 22 % (ref 5–13)
MONOCYTES NFR BLD: 37 % (ref 5–13)
NEUTS SEG # BLD: 0.1 K/UL (ref 1.8–8)
NEUTS SEG # BLD: 0.2 K/UL (ref 1.8–8)
NEUTS SEG NFR BLD: 11 % (ref 32–75)
NEUTS SEG NFR BLD: 19 % (ref 32–75)
NRBC # BLD: 0 K/UL (ref 0–0.01)
NRBC # BLD: 0 K/UL (ref 0–0.01)
NRBC BLD-RTO: 0 PER 100 WBC
NRBC BLD-RTO: 0 PER 100 WBC
P-R INTERVAL, ECG05: 138 MS
PATH REV BLD -IMP: ABNORMAL
PLATELET # BLD AUTO: 16 K/UL (ref 150–400)
PLATELET # BLD AUTO: 22 K/UL (ref 150–400)
POTASSIUM SERPL-SCNC: 3.6 MMOL/L (ref 3.5–5.1)
Q-T INTERVAL, ECG07: 346 MS
QRS DURATION, ECG06: 142 MS
QTC CALCULATION (BEZET), ECG08: 505 MS
RBC # BLD AUTO: 2.38 M/UL (ref 4.1–5.7)
RBC # BLD AUTO: 2.82 M/UL (ref 4.1–5.7)
RBC MORPH BLD: ABNORMAL
RSV RNA SPEC QL NAA+PROBE: NOT DETECTED
RV+EV RNA SPEC QL NAA+PROBE: NOT DETECTED
SARS-COV-2 PCR, COVPCR: NOT DETECTED
SODIUM SERPL-SCNC: 136 MMOL/L (ref 136–145)
SPECIMEN EXP DATE BLD: NORMAL
STATUS OF UNIT,%ST: NORMAL
TIBC SERPL-MCNC: 200 UG/DL (ref 250–450)
TROPONIN-HIGH SENSITIVITY: 8 NG/L (ref 0–76)
UNIT DIVISION, %UDIV: 0
VENTRICULAR RATE, ECG03: 128 BPM
VIT B12 SERPL-MCNC: 1105 PG/ML (ref 193–986)
WBC # BLD AUTO: 0.9 K/UL (ref 4.1–11.1)
WBC # BLD AUTO: 1.1 K/UL (ref 4.1–11.1)
WBC MORPH BLD: ABNORMAL
WBC MORPH BLD: ABNORMAL

## 2022-07-16 PROCEDURE — 36430 TRANSFUSION BLD/BLD COMPNT: CPT

## 2022-07-16 PROCEDURE — 0202U NFCT DS 22 TRGT SARS-COV-2: CPT

## 2022-07-16 PROCEDURE — 74011000258 HC RX REV CODE- 258: Performed by: FAMILY MEDICINE

## 2022-07-16 PROCEDURE — P9016 RBC LEUKOCYTES REDUCED: HCPCS

## 2022-07-16 PROCEDURE — 86922 COMPATIBILITY TEST ANTIGLOB: CPT

## 2022-07-16 PROCEDURE — 99223 1ST HOSP IP/OBS HIGH 75: CPT | Performed by: INTERNAL MEDICINE

## 2022-07-16 PROCEDURE — 80048 BASIC METABOLIC PNL TOTAL CA: CPT

## 2022-07-16 PROCEDURE — 86921 COMPATIBILITY TEST INCUBATE: CPT

## 2022-07-16 PROCEDURE — 74011250636 HC RX REV CODE- 250/636: Performed by: FAMILY MEDICINE

## 2022-07-16 PROCEDURE — 74011250636 HC RX REV CODE- 250/636: Performed by: EMERGENCY MEDICINE

## 2022-07-16 PROCEDURE — 74011250636 HC RX REV CODE- 250/636: Performed by: INTERNAL MEDICINE

## 2022-07-16 PROCEDURE — 82746 ASSAY OF FOLIC ACID SERUM: CPT

## 2022-07-16 PROCEDURE — 96366 THER/PROPH/DIAG IV INF ADDON: CPT

## 2022-07-16 PROCEDURE — 71250 CT THORAX DX C-: CPT

## 2022-07-16 PROCEDURE — 85025 COMPLETE CBC W/AUTO DIFF WBC: CPT

## 2022-07-16 PROCEDURE — 36415 COLL VENOUS BLD VENIPUNCTURE: CPT

## 2022-07-16 PROCEDURE — 96365 THER/PROPH/DIAG IV INF INIT: CPT

## 2022-07-16 PROCEDURE — 65270000046 HC RM TELEMETRY

## 2022-07-16 PROCEDURE — 74176 CT ABD & PELVIS W/O CONTRAST: CPT

## 2022-07-16 PROCEDURE — 74011250637 HC RX REV CODE- 250/637: Performed by: INTERNAL MEDICINE

## 2022-07-16 RX ORDER — SODIUM CHLORIDE 9 MG/ML
100 INJECTION, SOLUTION INTRAVENOUS CONTINUOUS
Status: DISCONTINUED | OUTPATIENT
Start: 2022-07-16 | End: 2022-07-19

## 2022-07-16 RX ORDER — METRONIDAZOLE 500 MG/100ML
500 INJECTION, SOLUTION INTRAVENOUS EVERY 8 HOURS
Status: DISCONTINUED | OUTPATIENT
Start: 2022-07-16 | End: 2022-07-18 | Stop reason: DRUGHIGH

## 2022-07-16 RX ORDER — SODIUM CHLORIDE 9 MG/ML
250 INJECTION, SOLUTION INTRAVENOUS AS NEEDED
Status: DISCONTINUED | OUTPATIENT
Start: 2022-07-16 | End: 2022-07-19 | Stop reason: HOSPADM

## 2022-07-16 RX ADMIN — METRONIDAZOLE 500 MG: 500 INJECTION, SOLUTION INTRAVENOUS at 21:26

## 2022-07-16 RX ADMIN — CEFEPIME 2 G: 2 INJECTION, POWDER, FOR SOLUTION INTRAVENOUS at 17:09

## 2022-07-16 RX ADMIN — VANCOMYCIN HYDROCHLORIDE 2000 MG: 10 INJECTION, POWDER, LYOPHILIZED, FOR SOLUTION INTRAVENOUS at 01:35

## 2022-07-16 RX ADMIN — ACETAMINOPHEN 1000 MG: 500 TABLET ORAL at 11:50

## 2022-07-16 RX ADMIN — ACETAMINOPHEN 1000 MG: 500 TABLET ORAL at 23:07

## 2022-07-16 RX ADMIN — CEFEPIME 2 G: 2 INJECTION, POWDER, FOR SOLUTION INTRAVENOUS at 09:00

## 2022-07-16 RX ADMIN — SODIUM CHLORIDE 100 ML/HR: 900 INJECTION, SOLUTION INTRAVENOUS at 08:29

## 2022-07-16 RX ADMIN — METRONIDAZOLE 500 MG: 500 INJECTION, SOLUTION INTRAVENOUS at 14:18

## 2022-07-16 RX ADMIN — VANCOMYCIN HYDROCHLORIDE 750 MG: 750 INJECTION, POWDER, LYOPHILIZED, FOR SOLUTION INTRAVENOUS at 14:19

## 2022-07-16 NOTE — PROGRESS NOTES
Problem: Pressure Injury - Risk of  Goal: *Prevention of pressure injury  Description: Document Kash Scale and appropriate interventions in the flowsheet. Outcome: Progressing Towards Goal  Note: Pressure Injury Interventions:                                            Problem: Patient Education: Go to Patient Education Activity  Goal: Patient/Family Education  Outcome: Progressing Towards Goal     Problem: Falls - Risk of  Goal: *Absence of Falls  Description: Document Sonya Kidd Fall Risk and appropriate interventions in the flowsheet.   Outcome: Progressing Towards Goal  Note: Fall Risk Interventions:                                Problem: Patient Education: Go to Patient Education Activity  Goal: Patient/Family Education  Outcome: Progressing Towards Goal

## 2022-07-16 NOTE — CONSULTS
[address] [facility] [address], [facility], [address] [facility], [address] [place] [address]  [phone]          Brief Consult Note      81M  Pancytopenia  We do not have much record in the system of cancer Dx. Will see tomorrow.       Signed by: Pina Mendez MD                     July 16, 2022

## 2022-07-16 NOTE — H&P
[address] [facility] Adult  Hospitalist Group  History and Physical    Date of Service:  7/16/2022  Primary Care Provider: Reggie Sheridan DO  Source of information: The patient    Chief Complaint: Referral / Consult and Chills      History of Presenting Illness:   Jhonny Vee is a [de-identified] y.o. male with a pmhx prostate cancer, and hypertension who presents with  Fever, and chills. His sx started after receiveing a blood transfusion. In the ED,   He was fevrile to 102.9, tachycardic to 125, with low normal BP, and tachypea to 33. Labs were significant. In the Ed, he received tylenol, cefepime, vancomycin, levaquin, cefepime, and 2Lns. REVIEW OF SYSTEMS:  A comprehensive review of systems was negative except for that written in the History of Present Illness. Past Medical History:   Diagnosis Date    Cancer ([facility] [address]     PROSTATE    Hypertension       Past Surgical History:   Procedure Laterality Date    HX TONSILLECTOMY      HX UROLOGICAL      PROSTATECTOMY    GA ABDOMEN SURGERY PROC UNLISTED      STONES GALLBLADDER     Prior to Admission medications    Medication Sig Start Date End Date Taking? Authorizing Provider   senna-docusate (Senokot-S) 8.6-50 mg per tablet Take 1 Tablet by mouth two (2) times a day. Provider, Historical   polyethylene glycol (Miralax) 17 gram/dose powder Take 17 g by mouth two (2) times a day. Provider, Historical   OTHER Testosterone shot twice a month     Provider, Historical   triamterene-hydrochlorothiazide (MAXZIDE) 37.5-25 mg per tablet Take 1 Tab by mouth daily. Provider, Historical   tadalafil (CIALIS) 5 mg tablet Take 5 mg by mouth as needed. Provider, Historical     No Known Allergies   Family History   Problem Relation Age of Onset    Cancer Brother         liver      Social History:  reports that he quit smoking about 10 years ago. He has a 5.00 pack-year smoking history. He has never used smokeless tobacco. He reports current drug use. Drug: Prescription. He reports that he does not drink alcohol. Family and social history were personally reviewed, all pertinent and relevant details are outlined as above. Objective:     Visit Vitals  BP (!) 99/51   Pulse (!) 112   Temp (!) 102.9 °F (39.4 °C)   Resp (!) 33   Ht 5' 9\" (1.753 m)   Wt 93.8 kg (206 lb 12.7 oz)   SpO2 96%   BMI 30.54 kg/m²      O2 Device: None (Room air)    PHYSICAL EXAM:   General: Alert x oriented x 3, awake, no acute distress, resting in bed, pleasant male, appears to be stated age  HEENT: PEERL, EOMI, moist mucus membranes  Neck: Supple, no JVD, no meningeal signs  Chest: Clear to auscultation bilaterally   CVS: RRR, S1 S2 heard, no murmurs/rubs/gallops  Abd: Soft, non-tender, non-distended, +bowel sounds   Ext: No clubbing, no cyanosis, no edema  Neuro/Psych: Pleasant mood and affect, CN 2-12 grossly intact, sensory grossly within normal limit, Strength 5/5 in all extremities,   Cap refill: Brisk, less than 3 seconds  Pulses: 2+, symmetric in all extremities  Skin: Warm, dry, without rashes or lesions    Data Review: All diagnostic labs and studies have been reviewed. Abnormal Labs Reviewed   CBC WITH AUTOMATED DIFF - Abnormal; Notable for the following components:       Result Value    WBC 1.1 (*)     RBC 2.82 (*)     HGB 7.8 (*)     HCT 22.6 (*)     RDW 18.2 (*)     PLATELET 22 (*)     NEUTROPHILS 11 (*)     LYMPHOCYTES 65 (*)     MONOCYTES 22 (*)     ABS.  NEUTROPHILS 0.1 (*)     All other components within normal limits   METABOLIC PANEL, COMPREHENSIVE - Abnormal; Notable for the following components:    Sodium 133 (*)     Glucose 123 (*)     Bilirubin, total 2.7 (*)     AST (SGOT) 8 (*)     Globulin 4.3 (*)     A-G Ratio 0.8 (*)     All other components within normal limits       All Micro Results     Procedure Component Value Units Date/Time    CULTURE, BLOOD [[id number]] Collected: 07/15/22 2270    Order Status: Completed Updated: 07/16/22 0011    CULTURE, BLOOD [[id number]] Collected: 07/15/22 2256    Order Status: Completed Specimen: Blood Updated: 07/16/22 0005    COVID-19 RAPID TEST [[id number]] Collected: 07/15/22 2310    Order Status: Completed Specimen: Nasopharyngeal Updated: 07/15/22 2340     Specimen source Nasopharyngeal        COVID-19 rapid test Not detected        Comment: Rapid Abbott ID Now       Rapid NAAT:  The specimen is NEGATIVE for SARS-CoV-2, the novel coronavirus associated with COVID-19. Negative results should be treated as presumptive and, if inconsistent with clinical signs and symptoms or necessary for patient management, should be tested with an alternative molecular assay. Negative results do not preclude SARS-CoV-2 infection and should not be used as the sole basis for patient management decisions. This test has been authorized by the FDA under an Emergency Use Authorization (EUA) for use by authorized laboratories. Fact sheet for Healthcare Providers: [employer] AGdate.co.nz  Fact sheet for Patients: [employer] AGdate.co.nz       Methodology: Isothermal Nucleic Acid Amplification         CULTURE, BLOOD [[id number]] Collected: 07/15/22 2256    Order Status: Completed Specimen: Blood Updated: 07/15/22 2328    CULTURE, BLOOD, PAIRED [[id number]] Collected: 07/15/22 1837    Order Status: Canceled Specimen: Blood     URINE CULTURE HOLD SAMPLE [[id number]]     Order Status: Sent Specimen: Urine           IMAGING:   XR CHEST PORT   Final Result   No acute cardiopulmonary process.               ECG/ECHO:    Results for orders placed or performed during the hospital encounter of 07/15/22   EKG, 12 LEAD, INITIAL   Result Value Ref Range    Ventricular Rate 128 BPM    Atrial Rate 128 BPM    P-R Interval 138 ms    QRS Duration 142 ms    Q-T Interval 346 ms    QTC Calculation (Bezet) 505 ms    Calculated P Axis 6 degrees    Calculated R Axis -85 degrees    Calculated T Axis 61 degrees    Diagnosis       Sinus tachycardia with premature ventricular complexes or fusion complexes  Right bundle branch block  Left anterior fascicular block  ** Bifascicular block **  Abnormal ECG  When compared with ECG of 28-MAR-2012 15:11,  Previous ECG has undetermined rhythm, needs review  (RBBB and left anterior fascicular block) is now present          Assessment:   Given the patient's current clinical presentation, there is a high level of concern for decompensation if discharged from the emergency department. Complex decision making was performed, which includes reviewing the patient's available past medical records, laboratory results, and imaging studies. Active Problems:    Neutropenic fever ([facility] [address] (7/15/2022)      Plan:     #Severe Sepsis  #neutropenic fever  -sepsis assessment complete  -follow Ua/Ucx, and CXR  -continue abx with vancomycinn and cefepime  -sepsis fluds, trend lactate  -neutropenic precautions  -heme onc consulted    #pancytopenia  -heme onc consulted    #HTN  -hold BP meds for now given blood pressure low normal    #hx prostate cancer      DIET: No diet orders on file   ISOLATION PRECAUTIONS: There are currently no Active Isolations  CODE STATUS: No Order   DVT PROPHYLAXIS: Lovenox  ANTICIPATED DISCHARGE: 24-48 hours. EMERGENCY CONTACT/SURROGATE DECISION MAKER: Abdi Harvey (son)     CRITICAL CARE WAS PERFORMED FOR THIS ENCOUNTER: YES. I had a face to face encounter with the patient, reviewed and interpreted patient data including clinical events, labs, images, vital signs, I/O's, and examined patient. I have discussed the case and the plan and management of the patient's care with the consulting services, the bedside nurses and necessary ancillary providers. This patient has a high probability of imminent, clinically significant deterioration, which requires the highest level of preparedness to intervene urgently.  I participated in the decision-making and personally managed or directed the management of the following life and organ supporting interventions that required my frequent assessment to treat or prevent imminent deterioration. I personally spent 30 minutes of critical care time. This is time spent at this critically ill patient's bedside actively involved in patient care as well as the coordination of care and discussions with the patient's family. This does not include any procedural time which has been billed separately. Signed By: New Mercedes MD     July 16, 2022         Please note that this dictation may have been completed with Dragon, the computer voice recognition software. Quite often unanticipated grammatical, syntax, homophones, and other interpretive errors are inadvertently transcribed by the computer software. Please disregard these errors. Please excuse any errors that have escaped final proofreading.

## 2022-07-16 NOTE — PROGRESS NOTES
Pharmacist Note - Vancomycin Dosing    Consult provided for this [de-identified] y.o. male for indication of Bacteremia. Antibiotic regimen(s): Vancomycin and Cefepime  Patient on vancomycin PTA? NO     Recent Labs     07/15/22  1838   WBC 1.1*   CREA 0.90   BUN 16     Frequency of BMP: Daily  Height: 175.3 cm  Weight: 93.8 kg  Est CrCl: 74 ml/min; Temp (24hrs), Av.5 °F (37.5 °C), Min:97.7 °F (36.5 °C), Max:102.9 °F (39.4 °C)    The plan below is expected to result in a target range of AUC/KAREN 400-600    Therapy will be initiated with a loading dose of 2000 mg IV x 1 (given in ER) to be followed by a maintenance dose of 750 mg IV every 12 hours. Pharmacy to follow patient daily and order levels / make dose adjustments as appropriate. *Vancomycin has been dosed used Bayesian kinetics software to target an AUC/KAREN of 400-600, which provides adequate exposure for an assumed infection due to MRSA with an KAREN of 1 or less while reducing the risk of nephrotoxicity as seen with traditional trough based dosing goals.

## 2022-07-16 NOTE — PROGRESS NOTES
Bedside shift change report given to [address]  (oncoming nurse) by Elfego Bass RN  (offgoing nurse). Report included the following information SBAR, Kardex, MAR and Recent Results.

## 2022-07-16 NOTE — PROGRESS NOTES
Transition of Care Plan   RUR: 14%   Disposition: The disposition plan is home with family assistance   F/U with PCP/Specialist     Transport: son     Reason for Admission:  Neutropenic fever                     RUR Score:        14%             Plan for utilizing home health:  Not recommended         PCP: First and Last name:  Lavonne Bauer DO     Name of Practice:    Are you a current patient: Yes/No:    Approximate date of last visit:    Can you participate in a virtual visit with your PCP:                     Current Advanced Directive/Advance Care Plan: Full Code      Healthcare Decision Maker:   Click here to complete [address] including selection of the Healthcare Decision Maker Relationship (ie \"Primary\")                             Transition of Care Plan:                      CRM spoke with patient, introduced self, explained role, verified demographics, and offered assistance. The patient lives with his son in a home with no steps to enter. The patient is independent in his ADL's/IADL's and does not have any DME. The patient uses [facility] for his prescriptions. Care Management Interventions  PCP Verified by CM: Yes  Palliative Care Criteria Met (RRAT>21 & CHF Dx)?: No  Mode of Transport at Discharge:  Other (see comment)  Transition of Care Consult (CM Consult): Discharge Planning  MyChart Signup: No  Discharge Durable Medical Equipment: No  Health Maintenance Reviewed: Yes  Physical Therapy Consult: No  Occupational Therapy Consult: No  Speech Therapy Consult: No  Support Systems: Child(comfort)  Confirm Follow Up Transport: Self  [place] Resource Information Provided?: No  Discharge Location  Patient Expects to be Discharged to[de-identified] Home with family assistance      5:44 PM  REBEKAH Stafford

## 2022-07-16 NOTE — ED PROVIDER NOTES
50-year-old male with a history of prostate CA, hypertension presents with chief complaint of fever and chills. Patient received a blood transfusion earlier today and subsequently developed chills, fever and decreased appetite. He denies chest pain, shortness of breath, abdominal pain.            Past Medical History:   Diagnosis Date    Cancer ([address]     PROSTATE    Hypertension        Past Surgical History:   Procedure Laterality Date    HX TONSILLECTOMY      HX UROLOGICAL      PROSTATECTOMY    [place] ABDOMEN SURGERY PROC UNLISTED      STONES GALLBLADDER         Family History:   Problem Relation Age of Onset    Cancer Brother         liver       Social History     Socioeconomic History    Marital status: SINGLE     Spouse name: Not on file    Number of children: Not on file    Years of education: Not on file    Highest education level: Not on file   Occupational History    Not on file   Tobacco Use    Smoking status: Former Smoker     Packs/day: 0.25     Years: 20.00     Pack years: 5.00     Quit date: 7/28/2011     Years since quitting: 10.9    Smokeless tobacco: Never Used    Tobacco comment: former cigarette smoke   Substance and Sexual Activity    Alcohol use: No    Drug use: Yes     Types: Prescription    Sexual activity: Not on file   Other Topics Concern     Service Not Asked    Blood Transfusions Not Asked    Caffeine Concern Not Asked    Occupational Exposure Not Asked    Hobby Hazards Not Asked    Sleep Concern Not Asked    Stress Concern Not Asked    Weight Concern Not Asked    Special Diet Not Asked    Back Care Not Asked    Exercise Not Asked    Bike Helmet Not Asked   [address] Not Asked    Self-Exams Not Asked   Social History Narrative    Not on file     Social Determinants of Health     Financial Resource Strain:     Difficulty of Paying Living Expenses: Not on file   Food Insecurity:     Worried About Running Out of Food in the Last Year: Not on file   Danny Woodard Ran Out of Food in the Last Year: Not on file   Transportation Needs:     Lack of Transportation (Medical): Not on file    Lack of Transportation (Non-Medical): Not on file   Physical Activity:     Days of Exercise per Week: Not on file    Minutes of Exercise per Session: Not on file   Stress:     Feeling of Stress : Not on file   Social Connections:     Frequency of Communication with Friends and Family: Not on file    Frequency of Social Gatherings with Friends and Family: Not on file    Attends Sabianist Services: Not on file    Active Member of [employer] or Organizations: Not on file    Attends Club or Organization Meetings: Not on file    Marital Status: Not on file   Intimate Partner Violence:     Fear of Current or Ex-Partner: Not on file    Emotionally Abused: Not on file    Physically Abused: Not on file    Sexually Abused: Not on file   Housing Stability:     Unable to Pay for Housing in the Last Year: Not on file    Number of [place] in the Last Year: Not on file    Unstable Housing in the Last Year: Not on file         ALLERGIES: Patient has no known allergies. Review of Systems   Constitutional: Positive for chills, fatigue and fever. HENT: Negative for rhinorrhea. Respiratory: Negative for cough. Cardiovascular: Negative for chest pain. Gastrointestinal: Negative for abdominal pain. Genitourinary: Negative for dysuria. Musculoskeletal: Negative for back pain. Skin: Negative for wound. Neurological: Negative for headaches. Psychiatric/Behavioral: Negative for confusion. Vitals:    07/15/22 1743   BP: (!) 124/58   Pulse: (!) 125   Resp: 22   Temp: 99.5 °F (37.5 °C)   SpO2: 99%            Physical Exam  Vitals and nursing note reviewed. Constitutional:       General: He is not in acute distress. Appearance: Normal appearance. He is not ill-appearing, toxic-appearing or diaphoretic. HENT:      Head: Normocephalic.    Eyes:      Extraocular Movements: Extraocular movements intact. Cardiovascular:      Rate and Rhythm: Tachycardia present. Pulses: Normal pulses. Heart sounds: Normal heart sounds. Pulmonary:      Effort: Pulmonary effort is normal. No respiratory distress. Breath sounds: Normal breath sounds. Abdominal:      General: There is no distension. Palpations: Abdomen is soft. Musculoskeletal:         General: Normal range of motion. Cervical back: Normal range of motion. Skin:     General: Skin is dry. Capillary Refill: Capillary refill takes less than 2 seconds. Neurological:      Mental Status: He is alert and oriented to person, place, and time. Psychiatric:         Mood and Affect: Mood normal.          MDM  Number of Diagnoses or Management Options  Neutropenic fever ([address]  Diagnosis management comments:   Patient is borderline febrile. He did have a fever at home. Labs show pancytopenia and neutropenia. Patient will be covered with broad-spectrum antibiotics and admitted to hospital medicine for further management. Perfect Serve Consult for Admission  9:46 PM    ED Room Number: Room 15 ED  Patient Name and age:  Marely Medellin [de-identified] y.o.  male  Working Diagnosis: Neutropenic fever ([facility] [address]  (primary encounter diagnosis)    COVID-19 Suspicion:  no  Sepsis present:  no  Reassessment needed: no  Code Status:  Full Code  Readmission: no  Isolation Requirements:  yes  Recommended Level of Care:  telemetry  Department:[facility] Adult ED - 21   Other:  abx ordered    Total critical care time spent exclusive of procedures:  34 minutes         Amount and/or Complexity of Data Reviewed  Clinical lab tests: ordered and reviewed      ED Course as of 07/15/22 2219   Fri Jul 15, 2022   2218 EKG shows sinus bradycardia at a rate of 49, normal intervals, normal axis, no ischemic changes.  [RD]      ED Course User Index  [RD] Salvador James MD       Procedures

## 2022-07-17 LAB
ANION GAP SERPL CALC-SCNC: 6 MMOL/L (ref 5–15)
BASOPHILS # BLD: 0 K/UL (ref 0–0.1)
BASOPHILS NFR BLD: 0 % (ref 0–1)
BUN SERPL-MCNC: 11 MG/DL (ref 6–20)
BUN/CREAT SERPL: 15 (ref 12–20)
CALCIUM SERPL-MCNC: 8.2 MG/DL (ref 8.5–10.1)
CHLORIDE SERPL-SCNC: 106 MMOL/L (ref 97–108)
CO2 SERPL-SCNC: 25 MMOL/L (ref 21–32)
COMMENT, HOLDF: NORMAL
CREAT SERPL-MCNC: 0.75 MG/DL (ref 0.7–1.3)
DIFFERENTIAL METHOD BLD: ABNORMAL
EOSINOPHIL # BLD: 0 K/UL (ref 0–0.4)
EOSINOPHIL NFR BLD: 1 % (ref 0–7)
ERYTHROCYTE [DISTWIDTH] IN BLOOD BY AUTOMATED COUNT: 17.9 % (ref 11.5–14.5)
GLUCOSE SERPL-MCNC: 111 MG/DL (ref 65–100)
HAPTOGLOB SERPL-MCNC: 199 MG/DL (ref 30–200)
HCT VFR BLD AUTO: 18.8 % (ref 36.6–50.3)
HGB BLD-MCNC: 6.5 G/DL (ref 12.1–17)
HISTORY CHECKED?,CKHIST: NORMAL
IMM GRANULOCYTES # BLD AUTO: 0 K/UL
IMM GRANULOCYTES NFR BLD AUTO: 0 %
LDH SERPL L TO P-CCNC: 148 U/L (ref 85–241)
LYMPHOCYTES # BLD: 0.5 K/UL (ref 0.8–3.5)
LYMPHOCYTES NFR BLD: 48 % (ref 12–49)
MCH RBC QN AUTO: 27.4 PG (ref 26–34)
MCHC RBC AUTO-ENTMCNC: 34.6 G/DL (ref 30–36.5)
MCV RBC AUTO: 79.3 FL (ref 80–99)
MONOCYTES # BLD: 0.3 K/UL (ref 0–1)
MONOCYTES NFR BLD: 28 % (ref 5–13)
NEUTS BAND NFR BLD MANUAL: 1 % (ref 0–6)
NEUTS SEG # BLD: 0.2 K/UL (ref 1.8–8)
NEUTS SEG NFR BLD: 22 % (ref 32–75)
NRBC # BLD: 0 K/UL (ref 0–0.01)
NRBC BLD-RTO: 0 PER 100 WBC
PLATELET # BLD AUTO: 10 K/UL (ref 150–400)
PMV BLD AUTO: ABNORMAL FL (ref 8.9–12.9)
POTASSIUM SERPL-SCNC: 3.2 MMOL/L (ref 3.5–5.1)
RBC # BLD AUTO: 2.37 M/UL (ref 4.1–5.7)
RBC MORPH BLD: ABNORMAL
RETICS # AUTO: 0.01 M/UL (ref 0.03–0.1)
RETICS/RBC NFR AUTO: 0.5 % (ref 0.7–2.1)
SAMPLES BEING HELD,HOLD: NORMAL
SODIUM SERPL-SCNC: 137 MMOL/L (ref 136–145)
WBC # BLD AUTO: 1 K/UL (ref 4.1–11.1)

## 2022-07-17 PROCEDURE — 86922 COMPATIBILITY TEST ANTIGLOB: CPT

## 2022-07-17 PROCEDURE — P9073 PLATELETS PHERESIS PATH REDU: HCPCS

## 2022-07-17 PROCEDURE — 74011250637 HC RX REV CODE- 250/637: Performed by: INTERNAL MEDICINE

## 2022-07-17 PROCEDURE — 74011250636 HC RX REV CODE- 250/636: Performed by: INTERNAL MEDICINE

## 2022-07-17 PROCEDURE — 99223 1ST HOSP IP/OBS HIGH 75: CPT | Performed by: INTERNAL MEDICINE

## 2022-07-17 PROCEDURE — 30233N1 TRANSFUSION OF NONAUTOLOGOUS RED BLOOD CELLS INTO PERIPHERAL VEIN, PERCUTANEOUS APPROACH: ICD-10-PCS | Performed by: INTERNAL MEDICINE

## 2022-07-17 PROCEDURE — 83010 ASSAY OF HAPTOGLOBIN QUANT: CPT

## 2022-07-17 PROCEDURE — 74011250636 HC RX REV CODE- 250/636: Performed by: FAMILY MEDICINE

## 2022-07-17 PROCEDURE — 83615 LACTATE (LD) (LDH) ENZYME: CPT

## 2022-07-17 PROCEDURE — 36415 COLL VENOUS BLD VENIPUNCTURE: CPT

## 2022-07-17 PROCEDURE — 85045 AUTOMATED RETICULOCYTE COUNT: CPT

## 2022-07-17 PROCEDURE — 30233R1 TRANSFUSION OF NONAUTOLOGOUS PLATELETS INTO PERIPHERAL VEIN, PERCUTANEOUS APPROACH: ICD-10-PCS | Performed by: INTERNAL MEDICINE

## 2022-07-17 PROCEDURE — P9016 RBC LEUKOCYTES REDUCED: HCPCS

## 2022-07-17 PROCEDURE — 86921 COMPATIBILITY TEST INCUBATE: CPT

## 2022-07-17 PROCEDURE — 80048 BASIC METABOLIC PNL TOTAL CA: CPT

## 2022-07-17 PROCEDURE — 85025 COMPLETE CBC W/AUTO DIFF WBC: CPT

## 2022-07-17 PROCEDURE — 36430 TRANSFUSION BLD/BLD COMPNT: CPT

## 2022-07-17 PROCEDURE — 74011000258 HC RX REV CODE- 258: Performed by: FAMILY MEDICINE

## 2022-07-17 PROCEDURE — 65270000046 HC RM TELEMETRY

## 2022-07-17 RX ORDER — SODIUM CHLORIDE 9 MG/ML
250 INJECTION, SOLUTION INTRAVENOUS AS NEEDED
Status: DISCONTINUED | OUTPATIENT
Start: 2022-07-17 | End: 2022-07-19 | Stop reason: HOSPADM

## 2022-07-17 RX ORDER — POTASSIUM CHLORIDE 750 MG/1
40 TABLET, FILM COATED, EXTENDED RELEASE ORAL
Status: COMPLETED | OUTPATIENT
Start: 2022-07-17 | End: 2022-07-17

## 2022-07-17 RX ADMIN — VANCOMYCIN HYDROCHLORIDE 1000 MG: 1 INJECTION, POWDER, LYOPHILIZED, FOR SOLUTION INTRAVENOUS at 11:54

## 2022-07-17 RX ADMIN — CEFEPIME 2 G: 2 INJECTION, POWDER, FOR SOLUTION INTRAVENOUS at 00:47

## 2022-07-17 RX ADMIN — VANCOMYCIN HYDROCHLORIDE 750 MG: 750 INJECTION, POWDER, LYOPHILIZED, FOR SOLUTION INTRAVENOUS at 00:36

## 2022-07-17 RX ADMIN — METRONIDAZOLE 500 MG: 500 INJECTION, SOLUTION INTRAVENOUS at 22:52

## 2022-07-17 RX ADMIN — CEFEPIME 2 G: 2 INJECTION, POWDER, FOR SOLUTION INTRAVENOUS at 09:34

## 2022-07-17 RX ADMIN — CEFEPIME 2 G: 2 INJECTION, POWDER, FOR SOLUTION INTRAVENOUS at 16:17

## 2022-07-17 RX ADMIN — METRONIDAZOLE 500 MG: 500 INJECTION, SOLUTION INTRAVENOUS at 14:21

## 2022-07-17 RX ADMIN — POTASSIUM CHLORIDE 40 MEQ: 750 TABLET, FILM COATED, EXTENDED RELEASE ORAL at 09:34

## 2022-07-17 RX ADMIN — VANCOMYCIN HYDROCHLORIDE 1000 MG: 1 INJECTION, POWDER, LYOPHILIZED, FOR SOLUTION INTRAVENOUS at 22:52

## 2022-07-17 RX ADMIN — SODIUM CHLORIDE 100 ML/HR: 900 INJECTION, SOLUTION INTRAVENOUS at 09:38

## 2022-07-17 RX ADMIN — METRONIDAZOLE 500 MG: 500 INJECTION, SOLUTION INTRAVENOUS at 05:13

## 2022-07-17 NOTE — PROGRESS NOTES
Bedside shift change report given to [address]  (oncoming nurse) by Maria Guadalupe Abbasi RN  (offgoing nurse). Report included the following information SBAR, Kardex, MAR and Recent Results.

## 2022-07-17 NOTE — PROGRESS NOTES
07/17/22 0458   Critical Result Types   Type of Critical Result Laboratory   Critical Lab Result Types   Critical Lab Value Platelets   Platelets Value 10   Notification Information   Notified By (Name) Cindy Johnson   Time of Critical Result Notification 0354   Verbal Readback Provided Yes   Provider Notification   Was Provider Notified Yes   Name of Provider Din   Provider Gave Verbal Readback Yes  (written)   Time Provider Notified 9230   Date Provider Notified 07/17/22   Were Orders Received No

## 2022-07-17 NOTE — PROGRESS NOTES
Patient to receive platelets and 1 unit of blood today. Waiting to be seen by oncology. Problem: Pressure Injury - Risk of  Goal: *Prevention of pressure injury  Description: Document Kash Scale and appropriate interventions in the flowsheet. Outcome: Progressing Towards Goal  Note: Pressure Injury Interventions:  Sensory Interventions: Float heels,Keep linens dry and wrinkle-free,Maintain/enhance activity level,Minimize linen layers    Moisture Interventions: Absorbent underpads,Apply protective barrier, creams and emollients,Maintain skin hydration (lotion/cream)    Activity Interventions: Increase time out of bed,Pressure redistribution bed/mattress(bed type)    Mobility Interventions: HOB 30 degrees or less,Pressure redistribution bed/mattress (bed type),PT/OT evaluation    Nutrition Interventions: Offer support with meals,snacks and hydration                     Problem: Patient Education: Go to Patient Education Activity  Goal: Patient/Family Education  Outcome: Progressing Towards Goal     Problem: Falls - Risk of  Goal: *Absence of Falls  Description: Document Mirna Fall Risk and appropriate interventions in the flowsheet.   Outcome: Progressing Towards Goal  Note: Fall Risk Interventions:            Medication Interventions: Evaluate medications/consider consulting pharmacy,Patient to call before getting OOB,Teach patient to arise slowly                   Problem: Patient Education: Go to Patient Education Activity  Goal: Patient/Family Education  Outcome: Progressing Towards Goal     Problem: Discharge Planning  Goal: *Discharge to safe environment  Outcome: Progressing Towards Goal

## 2022-07-17 NOTE — PROGRESS NOTES
Pharmacist Note - Vancomycin  Indication:  neutropenic fever; concern for sigmoid colitis   Current regimen:  750 mg IV Q12H  Abx regimen:  vancomycin + cefepime + metronidazole  ID Following ?: YES  Concomitant nephrotoxic drugs: None  Frequency of BMP?: daily   Recent Labs     22  0327 22  1027 07/15/22  1838   WBC 1.0* 0.9* 1.1*   CREA 0.75 0.87 0.90   BUN 11 14 16     Est CrCl: 89 ml/min; UO: not documented    Temp (24hrs), Av.1 °F (37.3 °C), Min:97.8 °F (36.6 °C), Max:101.6 °F (38.7 °C)    Cultures:   7/15 blood x 3 - NG, preliminary     MRSA Swab ordered (if applicable)? N/A    Goal target range AUC/KAREN 400-600    Recent level history:  Date/Time Dose & Interval Measured Level (mcg/mL) Associated AUC/KAREN Dose Adjustment      750 mg Q12H  378 (predicted) 1000 mg Q12H                                           Plan: Change to 1000 mg IV Q12H.

## 2022-07-17 NOTE — PROGRESS NOTES
[address] [facility] [address], [address] [facility], [address] [place] [address]  [phone]      Hematology Oncology Note        Patient: Lucas Wick MRN: [id number]  SSN: [id number]    YOB: 1940  Age: [de-identified] y.o. Sex: male      Subjective:      Lucas Wikc is a [de-identified] y.o. male who is a poor historian. He feels fair. He is admitted with pancytopenia and neutropenic fever. He is on broad spectrum Abx. He is unaware of the Dx of treatment. Review of Systems:    Constitutional: negative  Eyes: negative  Ears, Nose, Mouth, Throat, and Face: negative  Respiratory: negative  Cardiovascular: negative  Gastrointestinal: negative  Genitourinary:negative  Integument/Breast: negative  Hematologic/Lymphatic: negative  Musculoskeletal:negative  Neurological: negative      Past Medical History:   Diagnosis Date    Cancer ([address]     PROSTATE    Hypertension      Past Surgical History:   Procedure Laterality Date    HX TONSILLECTOMY      HX UROLOGICAL      PROSTATECTOMY    ME ABDOMEN SURGERY PROC UNLISTED      STONES GALLBLADDER      Family History   Problem Relation Age of Onset    Cancer Brother         liver     Social History     Tobacco Use    Smoking status: Former Smoker     Packs/day: 0.25     Years: 20.00     Pack years: 5.00     Quit date: 7/28/2011     Years since quitting: 10.9    Smokeless tobacco: Never Used    Tobacco comment: former cigarette smoke   Substance Use Topics    Alcohol use: No      Prior to Admission medications    Medication Sig Start Date End Date Taking? Authorizing Provider   senna-docusate (Senokot-S) 8.6-50 mg per tablet Take 1 Tablet by mouth two (2) times a day. Provider, Historical   polyethylene glycol (Miralax) 17 gram/dose powder Take 17 g by mouth two (2) times a day.     Provider, Historical   OTHER Testosterone shot twice a month     Provider, Historical   triamterene-hydrochlorothiazide (MAXZIDE) 37.5-25 mg per tablet Take 1 Tab by mouth daily. Provider, Historical   tadalafil (CIALIS) 5 mg tablet Take 5 mg by mouth as needed. Provider, Historical              No Known Allergies        Objective:     Visit Vitals  /62 (BP 1 Location: Right arm, BP Patient Position: At rest)   Pulse 99   Temp 98.7 °F (37.1 °C)   Resp 18   Ht 5' 9\" (1.753 m)   Wt 206 lb 12.7 oz (93.8 kg)   SpO2 98%   BMI 30.54 kg/m²           Physical Exam:    GENERAL: alert, cooperative, no distress, appears stated age  EYE: conjunctivae/corneas clear. PERRL, EOM's intact Fundi benign  LYMPHATIC: Cervical, supraclavicular, and axillary nodes normal.   THROAT & NECK: normal and no erythema or exudates noted. LUNG: clear to auscultation bilaterally  HEART: regular rate and rhythm, S1, S2 normal, no murmur, click, rub or gallop  ABDOMEN: soft, non-tender. Bowel sounds normal. No masses,  no organomegaly  EXTREMITIES:  extremities normal, atraumatic, no cyanosis or edema  SKIN: Normal.  NEUROLOGIC: AOx3. Gait normal. Reflexes and motor strength normal and symmetric. Cranial nerves 2-12 and sensation grossly intact. IMAGING:     CT Results (most recent):  Results from Hospital Encounter encounter on 07/15/22    CT ABD PELV WO CONT    Narrative  INDICATION: Neutropenic fever, looking for infectious source. COMPARISON: None    CONTRAST: None. TECHNIQUE:  5 mm axial images were obtained through the chest, abdomen, and  pelvis. Coronal and sagittal reformats were generated. CT dose reduction was  achieved through use of a standardized protocol tailored for this examination  and automatic exposure control for dose modulation. The absence of intravenous contrast reduces the sensitivity for evaluation of  the mediastinum, yaritza, vasculature, and abdominal organs. FINDINGS:    CHEST WALL: No mass or axillary lymphadenopathy. THYROID: No nodule. MEDIASTINUM: No mass or lymphadenopathy.   YARITZA: No mass or lymphadenopathy. THORACIC AORTA: Calcified plaque of the thoracic aorta with focal ectasia of the  aortic arch. No aneurysm. MAIN PULMONARY ARTERY: Normal in caliber. TRACHEA/BRONCHI: Patent. ESOPHAGUS: No wall thickening or dilatation. HEART: Scattered coronary calcifications. No pericardial effusion. PLEURA: No effusion or pneumothorax. LUNGS: No evidence of pneumonia. No suspicious lung nodule. ABDOMEN/PELVIS: There is circumferential wall thickening of the sigmoid colon  with surrounding inflammatory fat stranding consistent with colitis. There are  scattered diverticula but the diverticula do not appear to be the source of the  inflammation. The small bowel is normal in caliber. There is no ascites or  lymphadenopathy. 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 There is ectasia of the left common iliac artery measuring 2.1 cm. There are  surgical clips from prostatectomy and pelvic lymph node dissection. Urinary  bladder is normal.    Bilateral simple renal cysts. Nonspecific hepatic hypodensity measuring 13 mm. Splenomegaly measuring 16 cm. BONES: No destructive bone lesion. Impression  1. Sigmoid colitis. No perforation or abscess. 2.  No pulmonary infection. 3.  Nonspecific 13 mm hepatic hypodensity incompletely characterized without  contrast.  4.  Splenomegaly. 5.  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         LABS:     Lab Results   Component Value Date/Time    WBC 1.0 (L) 07/17/2022 03:27 AM    HGB 6.5 (L) 07/17/2022 03:27 AM    HCT 18.8 (L) 07/17/2022 03:27 AM    PLATELET 10 (LL) 51/02/4684 03:27 AM    MCV 79.3 (L) 07/17/2022 03:27 AM         Lab Results   Component Value Date/Time    Sodium 137 07/17/2022 03:27 AM    Potassium 3.2 (L) 07/17/2022 03:27 AM    Chloride 106 07/17/2022 03:27 AM    CO2 25 07/17/2022 03:27 AM    Anion gap 6 07/17/2022 03:27 AM    Glucose 111 (H) 07/17/2022 03:27 AM    BUN 11 07/17/2022 03:27 AM    Creatinine 0.75 07/17/2022 03:27 AM    BUN/Creatinine ratio 15 07/17/2022 03:27 AM    GFR est AA >60 07/17/2022 03:27 AM    GFR est non-AA >60 07/17/2022 03:27 AM    Calcium 8.2 (L) 07/17/2022 03:27 AM    Bilirubin, total 2.7 (H) 07/15/2022 06:38 PM    Alk. phosphatase 103 07/15/2022 06:38 PM    Protein, total 7.9 07/15/2022 06:38 PM    Albumin 3.6 07/15/2022 06:38 PM    Globulin 4.3 (H) 07/15/2022 06:38 PM    A-G Ratio 0.8 (L) 07/15/2022 06:38 PM    ALT (SGPT) 12 07/15/2022 06:38 PM    AST (SGOT) 8 (L) 07/15/2022 06:38 PM           Assessment:     1. Pancytopenia    Suspect hematological illness  Due to poor history I will get a bone marrow biopsy done tomorrow  We will call [facility] to obtain some records. Plan:       1. Bone marrow Biopsy tomorrow  2. Cefepime   3. After the bone marrow we shall administer G-CSF  4.  Records from [facility]      Signed by: Devyn Gilliland MD                     July 17, 2022

## 2022-07-17 NOTE — CONSULTS
Infectious Disease Consult Note    Reason for Consult: Neutropenic fever  Date of Consultation: July 16, 2022  Date of Admission: 7/15/2022  Referring Physician: Dr Kat Bravo       HPI:  Mr. Castro Fofana is a 60-year-old gentleman with a history of lipoma status post resection and reconstruction with advancement flap in 2012 of his back, hematological disorder/anemia , who is admitted with neutropenic fever. I met with patient and his son who were not good historians. Patient tells me that he sees Dr. Greta Brasher and gets a shot daily as well as blood transfusions. I do not have records to review at this time but from review of the infusion center notes it appears that he has received blood transfusions at the infusion center with the most latest transfusion on 7/15/2022. Patient tells me that he gets transfusions all the time and does not have any fevers or chills generally. However yesterday after his transfusion he started getting shaking chills and fevers and decided to come to the hospital.  He also tells me that he gets another shot and since he has been started on the shot he has not been feeling well and he thinks that injection does not soothe his body. He is not able to expand on this anymore and his son does not have further information at this time. He states that he feels much better now than not having an appetite. He denies any ongoing fevers or chills. Denies any nausea vomiting diarrhea. He denies any particular arthralgia or myalgia. He denies any chest pain cough or upper respiratory symptoms. He denies any dysuria. Denies any headache or neurological symptoms. He is very independent otherwise per him and his son. He still drives and ambulates independently. He does admit to some lower abdominal pain when asked. From chart review he has been febrile up to 102.9 yesterday and 101.6 today. His labs have shown pancytopenia with ANC of 200 today and 100 yesterday.   His platelets range between 16 and 22,000. He is anemic with a hemoglobin between 6 and 8 and hematocrit between 18 and 22.6. His last labs noted in 2012 showed normal platelets and a white count. His ANC in 2012 was 1300  His viral respiratory panel was negative. 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 He has been started on vancomycin cefepime and Flagyl          Past Medical History:  Past Medical History:   Diagnosis Date    Cancer ([facility] [address]     PROSTATE    Hypertension          Surgical History:  Past Surgical History:   Procedure Laterality Date    HX TONSILLECTOMY      HX UROLOGICAL      PROSTATECTOMY    AR ABDOMEN SURGERY PROC UNLISTED      STONES GALLBLADDER         Family History:   Family History   Problem Relation Age of Onset    Cancer Brother         liver         Social History:     · Living Situation: At home with some  · Tobacco: Denied  · Alcohol: Denied  · Illicit Drugs: Denied  · Travel History denied  · Exposures:   · Outdoor/Hiking: denied  · Animal/Pet: Denied  · Construction: denied  · Hot Tub: denied   · Brackish/stagnant exposure: denied  · TB exposure: denied  · Sick Contacts: denied     Allergies:  No Known Allergies      Review of Systems:     10 point review of systems per HPI. All others negative. Medications:  Current Facility-Administered Medications on File Prior to Encounter   Medication Dose Route Frequency Provider Last Rate Last Admin    0.9% sodium chloride infusion  25 mL/hr IntraVENous CONTINUOUS Shadia Carreno MD   IV Completed at 07/15/22 1330    0.9% sodium chloride infusion 250 mL  250 mL IntraVENous PRN Shadia Carreno MD         Current Outpatient Medications on File Prior to Encounter   Medication Sig Dispense Refill    senna-docusate (Senokot-S) 8.6-50 mg per tablet Take 1 Tablet by mouth two (2) times a day.       polyethylene glycol (Miralax) 17 gram/dose powder Take 17 g by mouth two (2) times a day.  OTHER Testosterone shot twice a month       triamterene-hydrochlorothiazide (MAXZIDE) 37.5-25 mg per tablet Take 1 Tab by mouth daily.  tadalafil (CIALIS) 5 mg tablet Take 5 mg by mouth as needed.                Physical Exam:    Vitals:   Patient Vitals for the past 24 hrs:   Temp Pulse Resp BP SpO2   07/16/22 2030 98.4 °F (36.9 °C) (!) 102 18 118/66 98 %   07/16/22 2015 97.8 °F (36.6 °C) (!) 103 18 116/67 100 %   07/16/22 2000 -- (!) 103 -- -- --   07/16/22 1954 98.4 °F (36.9 °C) (!) 101 18 118/62 97 %   07/16/22 1800 -- 96 -- -- --   07/16/22 1431 98.9 °F (37.2 °C) 99 16 (!) 104/59 96 %   07/16/22 1400 -- (!) 102 -- -- --   07/16/22 1200 -- (!) 113 -- -- --   07/16/22 1138 -- -- -- -- 97 %   07/16/22 1136 (!) 101.6 °F (38.7 °C) (!) 117 18 (!) 113/56 97 %   07/16/22 1037 99.6 °F (37.6 °C) -- -- -- --   07/16/22 1028 -- (!) 108 21 (!) 117/57 97 %   07/16/22 0730 -- 97 30 (!) 109/54 98 %   07/16/22 0530 -- 91 12 (!) 96/51 97 %   07/16/22 0130 98.5 °F (36.9 °C) (!) 102 (!) 7 (!) 100/50 96 %   07/16/22 0016 -- -- -- -- 96 %   07/15/22 2330 -- (!) 112 (!) 33 (!) 99/51 96 %   07/15/22 2250 (!) 102.9 °F (39.4 °C) (!) 114 18 (!) 108/57 97 %   07/15/22 2231 (!) 102.4 °F (39.1 °C) (!) 119 20 (!) 131/54 97 %   ·   · GEN: NAD  · HEENT:  no scleral icterus,   no thrush  · CV: S1, S2 heard regularly,   · Lungs: Clear to auscultation bilaterally  · Abdomen: soft, tenderness in the mid lower abdomen  · Extremities: no edema  · Neuro: Alert, oriented to self, place and situation, moves all extremities to commands, verbal   · Skin: no rash  · Psych: good affect, good eye contact, non tearful   · MSK no erythema or edema noted of the upper or lower extremity joints bilaterally      Labs:   Recent Results (from the past 24 hour(s))   CULTURE, BLOOD    Collection Time: 07/15/22 10:56 PM    Specimen: Blood   Result Value Ref Range Special Requests: NO SPECIAL REQUESTS      Culture result: NO GROWTH AFTER 5 HOURS     CULTURE, BLOOD    Collection Time: 07/15/22 10:56 PM    Specimen: Blood   Result Value Ref Range    Special Requests: NO SPECIAL REQUESTS      Culture result: NO GROWTH AFTER 6 HOURS     COVID-19 RAPID TEST    Collection Time: 07/15/22 11:10 PM   Result Value Ref Range    Specimen source Nasopharyngeal      COVID-19 rapid test Not detected NOTD     LACTIC ACID    Collection Time: 07/15/22 11:14 PM   Result Value Ref Range    Lactic acid 1.0 0.4 - 2.0 MMOL/L   SAMPLES BEING HELD    Collection Time: 07/15/22 11:24 PM   Result Value Ref Range    SAMPLES BEING HELD 1PST,1DRK GRN,1LAV,1BLUE,1RED     COMMENT        Add-on orders for these samples will be processed based on acceptable specimen integrity and analyte stability, which may vary by analyte.    TROPONIN-HIGH SENSITIVITY    Collection Time: 07/15/22 11:24 PM   Result Value Ref Range    Troponin-High Sensitivity 8 0 - 76 ng/L   IRON PROFILE    Collection Time: 07/15/22 11:24 PM   Result Value Ref Range    Iron 110 35 - 150 ug/dL    TIBC 200 (L) 250 - 450 ug/dL    Iron % saturation 55 (H) 20 - 50 %   FERRITIN    Collection Time: 07/15/22 11:24 PM   Result Value Ref Range    Ferritin 3,336 (H) 26 - 388 NG/ML   VITAMIN B12    Collection Time: 07/15/22 11:24 PM   Result Value Ref Range    Vitamin B12 1,105 (H) 193 - 986 pg/mL   FOLATE    Collection Time: 07/15/22 11:24 PM   Result Value Ref Range    Folate 12.8 5.0 - 21.0 ng/mL   CBC WITH AUTOMATED DIFF    Collection Time: 07/16/22 10:27 AM   Result Value Ref Range    WBC 0.9 (LL) 4.1 - 11.1 K/uL    RBC 2.38 (L) 4.10 - 5.70 M/uL    HGB 6.4 (L) 12.1 - 17.0 g/dL    HCT 18.9 (L) 36.6 - 50.3 %    MCV 79.4 (L) 80.0 - 99.0 FL    MCH 26.9 26.0 - 34.0 PG    MCHC 33.9 30.0 - 36.5 g/dL    RDW 18.5 (H) 11.5 - 14.5 %    PLATELET 16 (LL) 178 - 400 K/uL    NRBC 0.0 0  WBC    ABSOLUTE NRBC 0.00 0.00 - 0.01 K/uL    NEUTROPHILS 19 (L) 32 - 75 %    LYMPHOCYTES 42 12 - 49 %    MONOCYTES 37 (H) 5 - 13 %    EOSINOPHILS 2 0 - 7 %    BASOPHILS 0 0 - 1 %    IMMATURE GRANULOCYTES 0 %    ABS. NEUTROPHILS 0.2 (L) 1.8 - 8.0 K/UL    ABS. LYMPHOCYTES 0.4 (L) 0.8 - 3.5 K/UL    ABS. MONOCYTES 0.3 0.0 - 1.0 K/UL    ABS. EOSINOPHILS 0.0 0.0 - 0.4 K/UL    ABS. BASOPHILS 0.0 0.0 - 0.1 K/UL    ABS. IMM. GRANS. 0.0 K/UL    DF MANUAL      RBC COMMENTS ANISOCYTOSIS  1+        RBC COMMENTS POIKILOCYTOSIS  PRESENT        WBC COMMENTS Differential performed on buffy coat smear.      METABOLIC PANEL, BASIC    Collection Time: 07/16/22 10:27 AM   Result Value Ref Range    Sodium 136 136 - 145 mmol/L    Potassium 3.6 3.5 - 5.1 mmol/L    Chloride 103 97 - 108 mmol/L    CO2 28 21 - 32 mmol/L    Anion gap 5 5 - 15 mmol/L    Glucose 111 (H) 65 - 100 mg/dL    BUN 14 6 - 20 MG/DL    Creatinine 0.87 0.70 - 1.30 MG/DL    BUN/Creatinine ratio 16 12 - 20      GFR est AA >60 >60 ml/min/1.73m2    GFR est non-AA >60 >60 ml/min/1.73m2    Calcium 8.3 (L) 8.5 - 10.1 MG/DL   RESPIRATORY VIRUS PANEL W/COVID-19, PCR    Collection Time: 07/16/22 12:54 PM    Specimen: Nasopharyngeal   Result Value Ref Range    Adenovirus Not detected NOTD      Coronavirus 229E Not detected NOTD      Coronavirus HKU1 Not detected NOTD      Coronavirus CVNL63 Not detected NOTD      Coronavirus OC43 Not detected NOTD      SARS-CoV-2, PCR Not detected NOTD      Metapneumovirus Not detected NOTD      Rhinovirus and Enterovirus Not detected NOTD      Influenza A Not detected NOTD      Influenza A, subtype H1 Not detected NOTD      Influenza A, subtype H3 Not detected NOTD      INFLUENZA A H1N1 PCR Not detected NOTD      Influenza B Not detected NOTD      Parainfluenza 1 Not detected NOTD      Parainfluenza 2 Not detected NOTD      Parainfluenza 3 Not detected NOTD      Parainfluenza virus 4 Not detected NOTD      RSV by PCR Not detected NOTD      B. parapertussis, PCR Not detected NOTD      Bordetella pertussis - PCR Not detected NOTD      Chlamydophila pneumoniae DNA, QL, PCR Not detected NOTD      Mycoplasma pneumoniae DNA, QL, PCR Not detected NOTD     RBC, ALLOCATE    Collection Time: 07/16/22  5:00 PM   Result Value Ref Range    HISTORY CHECKED? Historical check performed    FOLATE    Collection Time: 07/16/22  6:22 PM   Result Value Ref Range    Folate 13.1 5.0 - 21.0 ng/mL       Microbiology Data:       Blood: Pending        Imaging:   CT CHEST WO CONT: Patient Communication     Add Comments   Not seen       Study Result    Narrative & Impression   INDICATION: Neutropenic fever, looking for infectious source.     COMPARISON: None     CONTRAST: None.     TECHNIQUE:  5 mm axial images were obtained through the chest, abdomen, and  pelvis. Coronal and sagittal reformats were generated. CT dose reduction was  achieved through use of a standardized protocol tailored for this examination  and automatic exposure control for dose modulation.     The absence of intravenous contrast reduces the sensitivity for evaluation of  the mediastinum, yaritza, vasculature, and abdominal organs.     FINDINGS:     CHEST WALL: No mass or axillary lymphadenopathy. THYROID: No nodule. MEDIASTINUM: No mass or lymphadenopathy. YARITZA: No mass or lymphadenopathy. THORACIC AORTA: Calcified plaque of the thoracic aorta with focal ectasia of the  aortic arch. No aneurysm. MAIN PULMONARY ARTERY: Normal in caliber. TRACHEA/BRONCHI: Patent. ESOPHAGUS: No wall thickening or dilatation. HEART: Scattered coronary calcifications. No pericardial effusion. PLEURA: No effusion or pneumothorax. LUNGS: No evidence of pneumonia. No suspicious lung nodule. ABDOMEN/PELVIS: There is circumferential wall thickening of the sigmoid colon  with surrounding inflammatory fat stranding consistent with colitis. There are  scattered diverticula but the diverticula do not appear to be the source of the  inflammation. The small bowel is normal in caliber.  There is no ascites or  lymphadenopathy. 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 There is ectasia of the left common iliac artery measuring 2.1 cm. There are  surgical clips from prostatectomy and pelvic lymph node dissection. Urinary  bladder is normal.     Bilateral simple renal cysts. Nonspecific hepatic hypodensity measuring 13 mm. Splenomegaly measuring 16 cm.     BONES: No destructive bone lesion.     IMPRESSION  1. Sigmoid colitis. No perforation or abscess. 2.  No pulmonary infection. 3.  Nonspecific 13 mm hepatic hypodensity incompletely characterized without  contrast.  4.  Splenomegaly. 5.  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 Assessment / Plan:   Mr. Lesley Gore is a 52-year-old gentleman with a history of lipoma status post resection and reconstruction with advancement flap in 2012 of his back, hematological disorder/anemia , who is admitted with neutropenic fever. 1) neutropenic fever, imaging concerning for sigmoid colitis  2) pancytopenia  3) ? Hematological underlying disorder as patient states he sees Dr. 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 7) splenomegaly      Plan   on broad antibiotics including IV vancomycin cefepime and Flagyl  Pending blood cultures  If diarrhea check stool studies including C. difficile and GI PCR  ?   Can be related to medications as patient states he gets some shot daily with hematology  We will need to get more records to review  No localized symptoms to suspect pneumonia or UTI  If he does not improve with antibiotics, would recommend a fungal work-up and possible treatment  Discussed that fevers can be related to infusions but will need to rule out other infections  He denies any high risk epidemiological risk factors for atypical infections at this time  Please obtain records from Dr. Mark Regalado office ( hematology)   If blood cultures remain negative hopeful to stop vancomycin  Risk for antibiotic associated adverse effects, nephrotoxicity, C. Difficile , and other toxicities     Thank for the opportunity to participate in the care of this patient. Please contact with questions or concerns.        Tiki Moses DO  9:40 PM

## 2022-07-17 NOTE — PROGRESS NOTES
Hospitalist Progress Note      Hospital summary: Marely Medellin is a [de-identified] y.o. male with a pmhx prostate cancer, and hypertension who presents with  Fever, and chills. His sx started after receiveing a blood transfusion.      In the ED,   He was fevrile to 102.9, tachycardic to 125, with low normal BP, and tachypea to 33. Labs were significant. In the ED, he received tylenol, cefepime, vancomycin, levaquin, cefepime, and 2Lns 7/15/2022      Assessment/Plan:  #Septic shock om poa   #Neutropenic fever due to sigmoid colitis   - blood cx NGTD . Normal lactic   - Ua/Ucx ordered   - CT chest/ abd : Sigmoid colitis  - resp viral negative   -continue empiric abx with IV vancomycin and cefepime, flagyl  - appreciate ID input   -neutropenic precautions     #Pancytopenia  Hx Large B cell lymphoma ? per chart review   -heme onc on board   - unclear whether patient on chemo or not. Need records from his oncology office   - plan for bone marrow biopsy     # Anemia   - hb 6.4 again today . 1 U PRBC ordered   - s/p 1U pRBC on 7/16  - iron profileok. 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 2.1 cm left common iliac artery aneurysm    Code status: Full  DVT prophylaxis: SCDs  Disposition: TBD   ----------------------------------------------    CC: Fever     S: Patient is seen and examined at bedside this AM. He does mild LLQ abd pain, no diarrhea, tolerating diet. No signs of active bleeding   Discussed with nursing       Review of Systems:  A comprehensive review of systems was negative.     O:  Visit Vitals  /62 (BP 1 Location: Right arm, BP Patient Position: At rest)   Pulse 95   Temp 98.7 °F (37.1 °C)   Resp 18   Ht 5' 9\" (1.753 m)   Wt 93.8 kg (206 lb 12.7 oz) SpO2 98%   BMI 30.54 kg/m²       PHYSICAL EXAM:  Gen: no distress, ill looking   HEENT: anicteric sclerae, normal conjunctiva, oropharynx clear, MM moist  Neck: supple, trachea midline, no adenopathy  Heart: RRR, no MRG, no JVD, no peripheral edema  Lungs: CTA b/l, non-labored respirations  Abd: soft, NT, ND, BS+, no organomegaly  Extr: warm  Skin: dry, no rash  Neuro: CN II-XII grossly intact, normal speech, moves all extremities  Psych: normal mood, appropriate affect      Intake/Output Summary (Last 24 hours) at 7/17/2022 1135  Last data filed at 7/17/2022 1103  Gross per 24 hour   Intake 1325.8 ml   Output --   Net 1325.8 ml        Recent labs & imaging reviewed:  Recent Results (from the past 24 hour(s))   RESPIRATORY VIRUS PANEL W/COVID-19, PCR    Collection Time: 07/16/22 12:54 PM    Specimen: Nasopharyngeal   Result Value Ref Range    Adenovirus Not detected NOTD      Coronavirus 229E Not detected NOTD      Coronavirus HKU1 Not detected NOTD      Coronavirus CVNL63 Not detected NOTD      Coronavirus OC43 Not detected NOTD      SARS-CoV-2, PCR Not detected NOTD      Metapneumovirus Not detected NOTD      Rhinovirus and Enterovirus Not detected NOTD      Influenza A Not detected NOTD      Influenza A, subtype H1 Not detected NOTD      Influenza A, subtype H3 Not detected NOTD      INFLUENZA A H1N1 PCR Not detected NOTD      Influenza B Not detected NOTD      Parainfluenza 1 Not detected NOTD      Parainfluenza 2 Not detected NOTD      Parainfluenza 3 Not detected NOTD      Parainfluenza virus 4 Not detected NOTD      RSV by PCR Not detected NOTD      B. parapertussis, PCR Not detected NOTD      Bordetella pertussis - PCR Not detected NOTD      Chlamydophila pneumoniae DNA, QL, PCR Not detected NOTD      Mycoplasma pneumoniae DNA, QL, PCR Not detected NOTD     RBC, ALLOCATE    Collection Time: 07/16/22  5:00 PM   Result Value Ref Range    HISTORY CHECKED?  Historical check performed    FOLATE    Collection Time: 07/16/22  6:22 PM   Result Value Ref Range    Folate 13.1 5.0 - 21.0 ng/mL   CBC WITH AUTOMATED DIFF    Collection Time: 07/17/22  3:27 AM   Result Value Ref Range    WBC 1.0 (L) 4.1 - 11.1 K/uL    RBC 2.37 (L) 4.10 - 5.70 M/uL    HGB 6.5 (L) 12.1 - 17.0 g/dL    HCT 18.8 (L) 36.6 - 50.3 %    MCV 79.3 (L) 80.0 - 99.0 FL    MCH 27.4 26.0 - 34.0 PG    MCHC 34.6 30.0 - 36.5 g/dL    RDW 17.9 (H) 11.5 - 14.5 %    PLATELET 10 (LL) 822 - 400 K/uL    MPV ABNORMAL 8.9 - 12.9 FL    NRBC 0.0 0  WBC    ABSOLUTE NRBC 0.00 0.00 - 0.01 K/uL    NEUTROPHILS 22 (L) 32 - 75 %    BAND NEUTROPHILS 1 0 - 6 %    LYMPHOCYTES 48 12 - 49 %    MONOCYTES 28 (H) 5 - 13 %    EOSINOPHILS 1 0 - 7 %    BASOPHILS 0 0 - 1 %    IMMATURE GRANULOCYTES 0 %    ABS. NEUTROPHILS 0.2 (L) 1.8 - 8.0 K/UL    ABS. LYMPHOCYTES 0.5 (L) 0.8 - 3.5 K/UL    ABS. MONOCYTES 0.3 0.0 - 1.0 K/UL    ABS. EOSINOPHILS 0.0 0.0 - 0.4 K/UL    ABS. BASOPHILS 0.0 0.0 - 0.1 K/UL    ABS. IMM.  GRANS. 0.0 K/UL    DF MANUAL      RBC COMMENTS ANISOCYTOSIS  1+        RBC COMMENTS MICROCYTOSIS  1+        RBC COMMENTS OVALOCYTES  1+        RBC COMMENTS TEARDROP CELLS  1+        RBC COMMENTS SCHISTOCYTES  PRESENT       METABOLIC PANEL, BASIC    Collection Time: 07/17/22  3:27 AM   Result Value Ref Range    Sodium 137 136 - 145 mmol/L    Potassium 3.2 (L) 3.5 - 5.1 mmol/L    Chloride 106 97 - 108 mmol/L    CO2 25 21 - 32 mmol/L    Anion gap 6 5 - 15 mmol/L    Glucose 111 (H) 65 - 100 mg/dL    BUN 11 6 - 20 MG/DL    Creatinine 0.75 0.70 - 1.30 MG/DL    BUN/Creatinine ratio 15 12 - 20      GFR est AA >60 >60 ml/min/1.73m2    GFR est non-AA >60 >60 ml/min/1.73m2    Calcium 8.2 (L) 8.5 - 10.1 MG/DL   HAPTOGLOBIN    Collection Time: 07/17/22  3:27 AM   Result Value Ref Range    Haptoglobin 199 30 - 200 mg/dL   LD    Collection Time: 07/17/22  3:27 AM   Result Value Ref Range     85 - 241 U/L   RBC, ALLOCATE    Collection Time: 07/17/22  8:00 AM   Result Value Ref Range HISTORY CHECKED? Historical check performed    PLATELETS, ALLOCATE    Collection Time: 07/17/22  8:15 AM   Result Value Ref Range    Unit number [id number]     Blood component type PLPH,LR PSTC     Unit division 00     Status of unit ISSUED    RETICULOCYTE COUNT    Collection Time: 07/17/22  8:34 AM   Result Value Ref Range    Reticulocyte count 0.5 (L) 0.7 - 2.1 %    Absolute Retic Cnt. 0.0124 (L) 0.0260 - 0.0950 M/ul   SAMPLES BEING HELD    Collection Time: 07/17/22  8:34 AM   Result Value Ref Range    SAMPLES BEING HELD 1lav     COMMENT        Add-on orders for these samples will be processed based on acceptable specimen integrity and analyte stability, which may vary by analyte. Recent Labs     07/17/22 0327 07/16/22  1027   WBC 1.0* 0.9*   HGB 6.5* 6.4*   HCT 18.8* 18.9*   PLT 10* 16*     Recent Labs     07/17/22  0327 07/16/22  1027 07/15/22  1838    136 133*   K 3.2* 3.6 3.7    103 101   CO2 25 28 27   BUN 11 14 16   CREA 0.75 0.87 0.90   * 111* 123*   CA 8.2* 8.3* 9.1     Recent Labs     07/15/22  1838   ALT 12      TBILI 2.7*   TP 7.9   ALB 3.6   GLOB 4.3*     No results for input(s): INR, PTP, APTT, INREXT, INREXT in the last 72 hours. Recent Labs     07/15/22  2324   TIBC 200*   PSAT 55*   FERR 3,336*      Lab Results   Component Value Date/Time    Folate 13.1 07/16/2022 06:22 PM      No results for input(s): PH, PCO2, PO2 in the last 72 hours. No results for input(s): CPK, CKNDX, TROIQ in the last 72 hours.     No lab exists for component: CPKMB  No results found for: CHOL, CHOLX, CHLST, CHOLV, HDL, HDLP, LDL, LDLC, DLDLP, TGLX, TRIGL, TRIGP, CHHD, CHHDX  No results found for: GLUCPOC  No results found for: COLOR, APPRN, SPGRU, REFSG, SUDHAKAR, PROTU, GLUCU, KETU, BILU, UROU, MILDRED, LEUKU, GLUKE, EPSU, BACTU, WBCU, RBCU, CASTS, UCRY    Med list reviewed  Current Facility-Administered Medications   Medication Dose Route Frequency    0.9% sodium chloride infusion 250 mL  250 mL IntraVENous PRN    0.9% sodium chloride infusion 250 mL  250 mL IntraVENous PRN    vancomycin (VANCOCIN) 1,000 mg in 0.9% sodium chloride 250 mL ([id number])  1,000 mg IntraVENous Q12H    cefepime (MAXIPIME) 2 g in 0.9% sodium chloride (MBP/ADV) 100 mL MBP  2 g IntraVENous Q8H    Vancomycin per Pharmacy Dosing   Other Rx Dosing/Monitoring    0.9% sodium chloride infusion  100 mL/hr IntraVENous CONTINUOUS    metroNIDAZOLE (FLAGYL) IVPB premix 500 mg  500 mg IntraVENous Q8H    0.9% sodium chloride infusion 250 mL  250 mL IntraVENous PRN    sodium chloride (NS) flush 5-10 mL  5-10 mL IntraVENous PRN    acetaminophen (TYLENOL) tablet 1,000 mg  1,000 mg Oral Q6H PRN       Care Plan discussed with:  Patient/Family and Nurse    Marie Craig MD  Internal Medicine  Date of Service: 7/17/2022

## 2022-07-18 ENCOUNTER — APPOINTMENT (OUTPATIENT)
Dept: CT IMAGING | Age: 82
DRG: 871 | End: 2022-07-18
Attending: INTERNAL MEDICINE
Payer: MEDICARE

## 2022-07-18 LAB
ABO + RH BLD: NORMAL
ANION GAP SERPL CALC-SCNC: 7 MMOL/L (ref 5–15)
BASOPHILS # BLD: 0 K/UL (ref 0–0.1)
BASOPHILS NFR BLD: 0 % (ref 0–1)
BLD PROD TYP BPU: NORMAL
BLOOD BANK CMNT PATIENT-IMP: NORMAL
BLOOD GROUP ANTIBODIES SERPL: NORMAL
BPU ID: NORMAL
BUN SERPL-MCNC: 10 MG/DL (ref 6–20)
BUN/CREAT SERPL: 14 (ref 12–20)
CALCIUM SERPL-MCNC: 8.1 MG/DL (ref 8.5–10.1)
CHLORIDE SERPL-SCNC: 106 MMOL/L (ref 97–108)
CO2 SERPL-SCNC: 26 MMOL/L (ref 21–32)
CREAT SERPL-MCNC: 0.73 MG/DL (ref 0.7–1.3)
CROSSMATCH RESULT,%XM: NORMAL
CROSSMATCH RESULT,%XM: NORMAL
DIFFERENTIAL METHOD BLD: ABNORMAL
EOSINOPHIL # BLD: 0 K/UL (ref 0–0.4)
EOSINOPHIL NFR BLD: 0 % (ref 0–7)
ERYTHROCYTE [DISTWIDTH] IN BLOOD BY AUTOMATED COUNT: 17.2 % (ref 11.5–14.5)
GLUCOSE SERPL-MCNC: 103 MG/DL (ref 65–100)
HCT VFR BLD AUTO: 22.4 % (ref 36.6–50.3)
HGB BLD-MCNC: 7.7 G/DL (ref 12.1–17)
IMM GRANULOCYTES # BLD AUTO: 0 K/UL
IMM GRANULOCYTES NFR BLD AUTO: 0 %
LYMPHOCYTES # BLD: 0.7 K/UL (ref 0.8–3.5)
LYMPHOCYTES NFR BLD: 75 % (ref 12–49)
MCH RBC QN AUTO: 28 PG (ref 26–34)
MCHC RBC AUTO-ENTMCNC: 34.4 G/DL (ref 30–36.5)
MCV RBC AUTO: 81.5 FL (ref 80–99)
MONOCYTES # BLD: 0.2 K/UL (ref 0–1)
MONOCYTES NFR BLD: 17 % (ref 5–13)
NEUTS SEG # BLD: 0.1 K/UL (ref 1.8–8)
NEUTS SEG NFR BLD: 8 % (ref 32–75)
NRBC # BLD: 0 K/UL (ref 0–0.01)
NRBC BLD-RTO: 0 PER 100 WBC
PLATELET # BLD AUTO: 11 K/UL (ref 150–400)
PMV BLD AUTO: ABNORMAL FL (ref 8.9–12.9)
POTASSIUM SERPL-SCNC: 3.6 MMOL/L (ref 3.5–5.1)
RBC # BLD AUTO: 2.75 M/UL (ref 4.1–5.7)
RBC MORPH BLD: ABNORMAL
SODIUM SERPL-SCNC: 139 MMOL/L (ref 136–145)
SPECIMEN EXP DATE BLD: NORMAL
STATUS OF UNIT,%ST: NORMAL
UNIT DIVISION, %UDIV: 0
WBC # BLD AUTO: 1 K/UL (ref 4.1–11.1)
WBC MORPH BLD: ABNORMAL

## 2022-07-18 PROCEDURE — 99232 SBSQ HOSP IP/OBS MODERATE 35: CPT | Performed by: INTERNAL MEDICINE

## 2022-07-18 PROCEDURE — 74011000250 HC RX REV CODE- 250: Performed by: EMERGENCY MEDICINE

## 2022-07-18 PROCEDURE — 36415 COLL VENOUS BLD VENIPUNCTURE: CPT

## 2022-07-18 PROCEDURE — 85025 COMPLETE CBC W/AUTO DIFF WBC: CPT

## 2022-07-18 PROCEDURE — 65270000046 HC RM TELEMETRY

## 2022-07-18 PROCEDURE — 97165 OT EVAL LOW COMPLEX 30 MIN: CPT

## 2022-07-18 PROCEDURE — 74011250636 HC RX REV CODE- 250/636: Performed by: FAMILY MEDICINE

## 2022-07-18 PROCEDURE — 97535 SELF CARE MNGMENT TRAINING: CPT

## 2022-07-18 PROCEDURE — 74011000258 HC RX REV CODE- 258: Performed by: FAMILY MEDICINE

## 2022-07-18 PROCEDURE — 74011250636 HC RX REV CODE- 250/636: Performed by: INTERNAL MEDICINE

## 2022-07-18 PROCEDURE — 87103 BLOOD FUNGUS CULTURE: CPT

## 2022-07-18 PROCEDURE — 80048 BASIC METABOLIC PNL TOTAL CA: CPT

## 2022-07-18 PROCEDURE — 97161 PT EVAL LOW COMPLEX 20 MIN: CPT

## 2022-07-18 RX ORDER — METRONIDAZOLE 500 MG/100ML
500 INJECTION, SOLUTION INTRAVENOUS EVERY 12 HOURS
Status: DISCONTINUED | OUTPATIENT
Start: 2022-07-19 | End: 2022-07-19 | Stop reason: HOSPADM

## 2022-07-18 RX ADMIN — SODIUM CHLORIDE, PRESERVATIVE FREE 10 ML: 5 INJECTION INTRAVENOUS at 13:53

## 2022-07-18 RX ADMIN — CEFEPIME 2 G: 2 INJECTION, POWDER, FOR SOLUTION INTRAVENOUS at 17:59

## 2022-07-18 RX ADMIN — METRONIDAZOLE 500 MG: 500 INJECTION, SOLUTION INTRAVENOUS at 14:44

## 2022-07-18 RX ADMIN — CEFEPIME 2 G: 2 INJECTION, POWDER, FOR SOLUTION INTRAVENOUS at 09:15

## 2022-07-18 RX ADMIN — CEFEPIME 2 G: 2 INJECTION, POWDER, FOR SOLUTION INTRAVENOUS at 01:17

## 2022-07-18 RX ADMIN — METRONIDAZOLE 500 MG: 500 INJECTION, SOLUTION INTRAVENOUS at 05:08

## 2022-07-18 NOTE — PROGRESS NOTES
Problem: Falls - Risk of  Goal: *Absence of Falls  Description: Document Ronnell Ren Fall Risk and appropriate interventions in the flowsheet.   Outcome: Progressing Towards Goal  Note: Fall Risk Interventions:            Medication Interventions: Evaluate medications/consider consulting pharmacy,Patient to call before getting OOB,Teach patient to arise slowly

## 2022-07-18 NOTE — PROGRESS NOTES
PHYSICAL THERAPY EVALUATION/DISCHARGE  Patient: Jhonny Vee (44 y.o. male)  Date: 7/18/2022  Primary Diagnosis: Neutropenic fever ([facility] [address] [D70.9, R50.81]       Precautions: neutropenic         ASSESSMENT  Based on the objective data described below, the patient is presently modified independent with mobility and appears to be at baseline. Pt received sitting on side of bed stating he is waiting for bone marrow biopsy today. Pt ambulated around room without significant LOB or need for AD. Pt reports no concerns for mobility at home, anticipate no further needs. Educated pt to continue mobilizing with nursing to prevent deconditioning. Functional Outcome Measure: The patient scored 90/100 on the Barthel Index outcome measure which is indicative of patient is independent with self care. Other factors to consider for discharge: lives with son     Further skilled acute physical therapy is not indicated at this time. PLAN :  Recommendation for discharge: (in order for the patient to meet his/her long term goals)  No skilled physical therapy/ follow up rehabilitation needs identified at this time. This discharge recommendation:  Has been made in collaboration with the attending provider and/or case management    IF patient discharges home will need the following DME: none       SUBJECTIVE:   Patient stated [place] are they coming?     OBJECTIVE DATA SUMMARY:   HISTORY:    Past Medical History:   Diagnosis Date    Cancer ([facility] [address]     PROSTATE    Hypertension      Past Surgical History:   Procedure Laterality Date    HX TONSILLECTOMY      HX UROLOGICAL      PROSTATECTOMY    [place] ABDOMEN SURGERY PROC UNLISTED      STONES GALLBLADDER       Prior level of function: independent   Personal factors and/or comorbidities impacting plan of care: PMH    Home Situation  Home Environment: Private residence  # Steps to Enter: 3  Rails to Enter: Yes  Hand Rails : Bilateral  One/Two Story Residence: One story  Living Alone: No  Support Systems: Child(comfort)  Patient Expects to be Discharged to[de-identified] Home with family assistance  Current DME Used/Available at Home: None    EXAMINATION/PRESENTATION/DECISION MAKING:   Critical Behavior:        Cognition: Other (comment) (Some forgetfullness)     Hearing:     Skin:  intact  Edema: none  Range Of Motion:  AROM: Within functional limits           PROM: Within functional limits           Strength:    Strength: Within functional limits       Coordination:  Coordination: Within functional limits  Vision:      Functional Mobility:  Bed Mobility:              Transfers:  Sit to Stand: Modified independent  Stand to Sit: Modified independent     Balance:   Sitting: Intact  Standing: Intact  Ambulation/Gait Training:  Distance (ft): 20 Feet (ft)     Ambulation - Level of Assistance: Modified independent     Base of Support: Narrowed    Functional Measure:  Barthel Index:    Bathin  Bladder: 10  Bowels: 10  Groomin  Dressing: 10  Feeding: 10  Mobility: 15  Stairs: 5  Toilet Use: 10  Transfer (Bed to Chair and Back): 15  Total: 90/100       The Barthel ADL Index: Guidelines  1. The index should be used as a record of what a patient does, not as a record of what a patient could do. 2. The main aim is to establish degree of independence from any help, physical or verbal, however minor and for whatever reason. 3. The need for supervision renders the patient not independent. 4. A patient's performance should be established using the best available evidence. Asking the patient, friends/relatives and nurses are the usual sources, but direct observation and common sense are also important. However direct testing is not needed. 5. Usually the patient's performance over the preceding 24-48 hours is important, but occasionally longer periods will be relevant. 6. Middle categories imply that the patient supplies over 50 per cent of the effort. 7. Use of aids to be independent is allowed.     Score Interpretation (from [facility] 83)    Independent   60-79 Minimally independent   40-59 Partially dependent   20-39 Very dependent   <20 Totally dependent     -Guillermo Bailon., Barthel, D.W. (1965). Functional evaluation: the Barthel Index. [address] ([address] (1997). The Barthel activities of daily living index: self-reporting versus actual performance in the old (> or = 75 years). Journal [address] 45(7), 14 [facility], J.J.M.F, Michael Desai., Aris Argue. (1999). Measuring the change in disability after inpatient rehabilitation; comparison of the responsiveness of the Barthel Index and Functional [place] Measure. Journal of Neurology, Neurosurgery, and Psychiatry, 66(4), 307-082. ARTHUR Vidal, REZA Hernandez, & Ghazal De Luna M.A. (2004) Assessment of post-stroke quality of life in cost-effectiveness studies: The usefulness of the Barthel Index and the EuroQoL-5D. Quality of Life Research, 15, 323-11            Physical Therapy Evaluation Charge Determination   History Examination Presentation Decision-Making   MEDIUM  Complexity : 1-2 comorbidities / personal factors will impact the outcome/ POC  LOW Complexity : 1-2 Standardized tests and measures addressing body structure, function, activity limitation and / or participation in recreation  LOW Complexity : Stable, uncomplicated  Other outcome measures barthel  LOW       Based on the above components, the patient evaluation is determined to be of the following complexity level: LOW       Activity Tolerance:   Good      After treatment patient left in no apparent distress:   Supine in bed, Call bell within reach and Side rails x 3    COMMUNICATION/EDUCATION:   The patients plan of care was discussed with: Registered nurse. Fall prevention education was provided and the patient/caregiver indicated understanding.  and Patient/family have participated as able in goal setting and plan of care.     Thank you for this referral.  Jarod Sen, PT   Time Calculation: 13 mins

## 2022-07-18 NOTE — PROGRESS NOTES
Infectious Disease Progress Note          HPI:  Mr. Gene Lyon seen  Says feeling ok   Stool is hard, no diarrhea  Says fever, chills resolved  Denied any bleeding symptoms or cough           Physical Exam:    Vitals:   Patient Vitals for the past 24 hrs:   Temp Pulse Resp BP SpO2   07/18/22 1200 -- 82 -- -- --   07/18/22 1000 -- 79 -- -- --   07/18/22 0829 98.2 °F (36.8 °C) 85 16 (!) 114/57 97 %   07/18/22 0600 -- 63 -- -- --   07/18/22 0410 98.9 °F (37.2 °C) 93 16 (!) 115/59 96 %   07/18/22 0400 -- 81 -- -- --   07/18/22 0200 -- 98 -- -- --   07/17/22 2337 98.5 °F (36.9 °C) 90 16 (!) 113/58 94 %   07/17/22 2200 -- 97 -- -- --   07/17/22 2000 -- 96 -- -- --   07/17/22 1800 -- 95 -- -- --   07/17/22 1636 97.7 °F (36.5 °C) 99 18 115/60 96 %   07/17/22 1425 98.8 °F (37.1 °C) 94 18 134/72 98 %     · GEN: NAD  · HEENT:  no scleral icterus,   no thrush  · CV: S1, S2 heard regularly,   · Lungs: Clear to auscultation bilaterally  · Abdomen: soft non tender  · Extremities: no edema  · Neuro: Alert, oriented to self, place and situation, moves all extremities to commands, verbal   · Skin: no rash  · Psych: good affect, good eye contact, non tearful   · MSK no erythema or edema noted of the upper or lower extremity joints bilaterally      Labs:   Recent Results (from the past 24 hour(s))   CBC WITH AUTOMATED DIFF    Collection Time: 07/18/22  1:26 AM   Result Value Ref Range    WBC 1.0 (L) 4.1 - 11.1 K/uL    RBC 2.75 (L) 4.10 - 5.70 M/uL    HGB 7.7 (L) 12.1 - 17.0 g/dL    HCT 22.4 (L) 36.6 - 50.3 %    MCV 81.5 80.0 - 99.0 FL    MCH 28.0 26.0 - 34.0 PG    MCHC 34.4 30.0 - 36.5 g/dL    RDW 17.2 (H) 11.5 - 14.5 %    PLATELET 11 (LL) 414 - 400 K/uL    MPV ABNORMAL 8.9 - 12.9 FL    NRBC 0.0 0  WBC    ABSOLUTE NRBC 0.00 0.00 - 0.01 K/uL    NEUTROPHILS 8 (L) 32 - 75 %    LYMPHOCYTES 75 (H) 12 - 49 %    MONOCYTES 17 (H) 5 - 13 %    EOSINOPHILS 0 0 - 7 %    BASOPHILS 0 0 - 1 %    IMMATURE GRANULOCYTES 0 %    ABS.  NEUTROPHILS 0.1 (L) 1.8 - 8.0 K/UL    ABS. LYMPHOCYTES 0.7 (L) 0.8 - 3.5 K/UL    ABS. MONOCYTES 0.2 0.0 - 1.0 K/UL    ABS. EOSINOPHILS 0.0 0.0 - 0.4 K/UL    ABS. BASOPHILS 0.0 0.0 - 0.1 K/UL    ABS. IMM. GRANS. 0.0 K/UL    DF MANUAL      RBC COMMENTS ANISOCYTOSIS  1+        RBC COMMENTS TEARDROP CELLS  PRESENT        RBC COMMENTS SCHISTOCYTES  PRESENT        WBC COMMENTS ATYPICAL LYMPHOCYTES PRESENT     METABOLIC PANEL, BASIC    Collection Time: 07/18/22  1:26 AM   Result Value Ref Range    Sodium 139 136 - 145 mmol/L    Potassium 3.6 3.5 - 5.1 mmol/L    Chloride 106 97 - 108 mmol/L    CO2 26 21 - 32 mmol/L    Anion gap 7 5 - 15 mmol/L    Glucose 103 (H) 65 - 100 mg/dL    BUN 10 6 - 20 MG/DL    Creatinine 0.73 0.70 - 1.30 MG/DL    BUN/Creatinine ratio 14 12 - 20      GFR est AA >60 >60 ml/min/1.73m2    GFR est non-AA >60 >60 ml/min/1.73m2    Calcium 8.1 (L) 8.5 - 10.1 MG/DL       Microbiology Data:       Blood: CULTURE, BLOOD  Order: [id number]   Collected 7/15/2022 22:56     Status: Preliminary result    Specimen Information: Blood         0 Result Notes     Ref Range & Units 7/15/22 3746   Special Requests:   NO SPECIAL REQUESTS P    Culture result:   NO GROWTH 3 DAYS P    Resulting Agency  [address]                Imaging:   CT CHEST WO CONT: Patient Communication     Add Comments   Not seen       Study Result    Narrative & Impression   INDICATION: Neutropenic fever, looking for infectious source.     COMPARISON: None     CONTRAST: None.     TECHNIQUE:  5 mm axial images were obtained through the chest, abdomen, and  pelvis. Coronal and sagittal reformats were generated.   CT dose reduction was  achieved through use of a standardized protocol tailored for this examination  and automatic exposure control for dose modulation.     The absence of intravenous contrast reduces the sensitivity for evaluation of  the mediastinum, lesa, vasculature, and abdominal organs.     FINDINGS:     CHEST WALL: No mass or axillary lymphadenopathy. THYROID: No nodule. MEDIASTINUM: No mass or lymphadenopathy. YARITZA: No mass or lymphadenopathy. THORACIC AORTA: Calcified plaque of the thoracic aorta with focal ectasia of the  aortic arch. No aneurysm. MAIN PULMONARY ARTERY: Normal in caliber. TRACHEA/BRONCHI: Patent. ESOPHAGUS: No wall thickening or dilatation. HEART: Scattered coronary calcifications. No pericardial effusion. PLEURA: No effusion or pneumothorax. LUNGS: No evidence of pneumonia. No suspicious lung nodule. ABDOMEN/PELVIS: There is circumferential wall thickening of the sigmoid colon  with surrounding inflammatory fat stranding consistent with colitis. There are  scattered diverticula but the diverticula do not appear to be the source of the  inflammation. The small bowel is normal in caliber. There is no ascites or  lymphadenopathy. 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 There is ectasia of the left common iliac artery measuring 2.1 cm. There are  surgical clips from prostatectomy and pelvic lymph node dissection. Urinary  bladder is normal.     Bilateral simple renal cysts. Nonspecific hepatic hypodensity measuring 13 mm. Splenomegaly measuring 16 cm.     BONES: No destructive bone lesion.     IMPRESSION  1. Sigmoid colitis. No perforation or abscess. 2.  No pulmonary infection. 3.  Nonspecific 13 mm hepatic hypodensity incompletely characterized without  contrast.  4.  Splenomegaly. 5.  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 Assessment / Plan:   Mr. Marlene Woodward is a 50-year-old gentleman with a history of lipoma status post resection and reconstruction with advancement flap in 2012 of his back, hematological disorder/anemia , who is admitted with neutropenic fever.      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 6) splenomegaly      Plan  Stop iv vancomycin   On cefepime yl   Spoke to Dr Rodriguez Powell who informs that patient has been following and receives chemo   Azacitidine  Last dose 7/11/22   Usually has [address] about 100-200 per Dr Rodriguez Powell. Will need to discuss about antifungal prophylaxis with oncology if he has has sustained reduction in [address] < 500 and not expected to remain low long term. Will first assess for fungemia before any prophylaxis added   No need for bone marrow per Dr Rodriguez Powell and consult [facility] for oncolocy  Discussed with Dr Arva Duverney and patient today   Fevers improved and will monitor on above antibiotics  If recurrent fevers will need to evaluate further given immune suppression     Thank for the opportunity to participate in the care of this patient. Please contact with questions or concerns.        Marty Moore DO  12:27 PM

## 2022-07-18 NOTE — PROGRESS NOTES
Orders received, chart reviewed and patient evaluated by physical therapy. Recommend:  No skilled physical therapy/ follow up rehabilitation needs identified at this time. Recommend with nursing OOB to chair 3x/day and walking daily with 1 assist Thank you for completing as able in order to maintain patient strength, endurance and independence. Full evaluation to follow.

## 2022-07-18 NOTE — CONSULTS
Oncology Inpatient Consult    Subjective:     Rosa Edwards is a [de-identified] y.o. 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  Pt is chronically neutropenic and transfusion requiring for RBC  He was started on Azacitabine ; he had C2 on 7/11/22  Pt had prbc on 7/14/22 and presents 7/16 with fevers and chills  Ct c/a/p shows sigmoid colitis  ANC is 100; hgb 7.7 and plts are 11k  Pt denies any bleeding, no fredo diarrhea; some mild tenderness in the LLQ;     Past Medical History:   Diagnosis Date    Cancer ([facility] [address]     PROSTATE    Hypertension      Past Surgical History:   Procedure Laterality Date    HX TONSILLECTOMY      HX UROLOGICAL      PROSTATECTOMY    [place] ABDOMEN SURGERY PROC UNLISTED      STONES GALLBLADDER      Family History   Problem Relation Age of Onset    Cancer Brother         liver     Social History     Tobacco Use    Smoking status: Former Smoker     Packs/day: 0.25     Years: 20.00     Pack years: 5.00     Quit date: 7/28/2011     Years since quitting: 10.9    Smokeless tobacco: Never Used    Tobacco comment: former cigarette smoke   Substance Use Topics    Alcohol use: No      Current Facility-Administered Medications   Medication Dose Route Frequency Provider Last Rate Last Admin    [START ON 7/19/2022] metroNIDAZOLE (FLAGYL) IVPB premix 500 mg  500 mg IntraVENous Q12H Elena Rosa MD        0.9% sodium chloride infusion 250 mL  250 mL IntraVENous PRN Elena Rosa MD        0.9% sodium chloride infusion 250 mL  250 mL IntraVENous PRN Elena Rosa MD        cefepime (MAXIPIME) 2 g in 0.9% sodium chloride (MBP/ADV) 100 mL MBP  2 g IntraVENous Q8H Abdulaziz Roblero MD 25 mL/hr at 07/18/22 0915 2 g at 07/18/22 0915    0.9% sodium chloride infusion  100 mL/hr IntraVENous CONTINUOUS Elena Rosa  mL/hr at 22 0938 100 mL/hr at 22 0938    0.9% sodium chloride infusion 250 mL  250 mL IntraVENous PRN Alfredo Moe MD        sodium chloride (NS) flush 5-10 mL  5-10 mL IntraVENous PRN Luiza Wade MD   10 mL at 22 1353    acetaminophen (TYLENOL) tablet 1,000 mg  1,000 mg Oral Q6H PRN Annika Machado MD   1,000 mg at 22 2307        No Known Allergies     Review of Systems:  Pertinent items are noted in the History of Present Illness. Objective:     Patient Vitals for the past 8 hrs:   BP Temp Pulse Resp SpO2 Weight   22 1511 111/65 97.3 °F (36.3 °C) 83 18 98 % --   22 1400 -- -- 80 -- -- --   22 1200 -- -- 82 -- -- --   22 1000 -- -- 79 -- -- --   22 0910 -- -- -- -- -- 86.8 kg (191 lb 5.8 oz)     Temp (24hrs), Av.2 °F (36.8 °C), Min:97.3 °F (36.3 °C), Max:98.9 °F (37.2 °C)    No intake/output data recorded. Physical Exam:   Alert  No icterus   Lungs cta   Heart regular   Abdomen slightly distended, Nt    Lab/Data Review:  Recent Results (from the past 24 hour(s))   CBC WITH AUTOMATED DIFF    Collection Time: 22  1:26 AM   Result Value Ref Range    WBC 1.0 (L) 4.1 - 11.1 K/uL    RBC 2.75 (L) 4.10 - 5.70 M/uL    HGB 7.7 (L) 12.1 - 17.0 g/dL    HCT 22.4 (L) 36.6 - 50.3 %    MCV 81.5 80.0 - 99.0 FL    MCH 28.0 26.0 - 34.0 PG    MCHC 34.4 30.0 - 36.5 g/dL    RDW 17.2 (H) 11.5 - 14.5 %    PLATELET 11 (LL) 794 - 400 K/uL    MPV ABNORMAL 8.9 - 12.9 FL    NRBC 0.0 0  WBC    ABSOLUTE NRBC 0.00 0.00 - 0.01 K/uL    NEUTROPHILS 8 (L) 32 - 75 %    LYMPHOCYTES 75 (H) 12 - 49 %    MONOCYTES 17 (H) 5 - 13 %    EOSINOPHILS 0 0 - 7 %    BASOPHILS 0 0 - 1 %    IMMATURE GRANULOCYTES 0 %    ABS. NEUTROPHILS 0.1 (L) 1.8 - 8.0 K/UL    ABS. LYMPHOCYTES 0.7 (L) 0.8 - 3.5 K/UL    ABS. MONOCYTES 0.2 0.0 - 1.0 K/UL    ABS. EOSINOPHILS 0.0 0.0 - 0.4 K/UL    ABS. BASOPHILS 0.0 0.0 - 0.1 K/UL    ABS. IMM.  GRANS. 0.0 K/UL    DF MANUAL      RBC COMMENTS ANISOCYTOSIS  1+        RBC COMMENTS TEARDROP CELLS  PRESENT        RBC COMMENTS SCHISTOCYTES  PRESENT        WBC COMMENTS ATYPICAL LYMPHOCYTES PRESENT     METABOLIC PANEL, BASIC    Collection Time: 07/18/22  1:26 AM   Result Value Ref Range    Sodium 139 136 - 145 mmol/L    Potassium 3.6 3.5 - 5.1 mmol/L    Chloride 106 97 - 108 mmol/L    CO2 26 21 - 32 mmol/L    Anion gap 7 5 - 15 mmol/L    Glucose 103 (H) 65 - 100 mg/dL    BUN 10 6 - 20 MG/DL    Creatinine 0.73 0.70 - 1.30 MG/DL    BUN/Creatinine ratio 14 12 - 20      GFR est AA >60 >60 ml/min/1.73m2    GFR est non-AA >60 >60 ml/min/1.73m2    Calcium 8.1 (L) 8.5 - 10.1 MG/DL         Assessment:     Active Problems/plan:  MDS;  BMBX 5/2022 shows left shifted myeloid lineage with 3.7% myeloblasts; also 1% element of monoclonal  B cell population with CLL/SLl phenotype;  Chronic neutropenia and transfusion requiring anemia; last 7/14/22  On Azacitabine; C2 on 7/11/22  C3 scheduled for 8/8/22  Neutropenic precautions  Transfuse prbc for hgb < 7  Transfuse plts for < 10k or bleeding/invasive procedures  Pt will f/u with Dr Zeina Bates after discharge    Febrile neutropenia  On cefepime and Flagyl; ID following; suspected sigmoid colitis ( per CT); cx pending  Afebrile now;

## 2022-07-18 NOTE — PROGRESS NOTES
Hospitalist Progress Note      Hospital summary: Marely Medellin is a [de-identified] y.o. male with a pmhx prostate cancer, and hypertension who presents with  Fever, and chills. His sx started after receiveing a blood transfusion.      In the ED,   He was fevrile to 102.9, tachycardic to 125, with low normal BP, and tachypea to 33. Labs were significant. In the ED, he received tylenol, cefepime, vancomycin, levaquin, cefepime, and 2Lns 7/15/2022      Assessment/Plan:  #Septic shock on poa   #Neutropenic fever due to sigmoid colitis ?  - blood cx NGTD . Normal lactic   - Ua/Ucx ordered   - CT chest/ abd : Sigmoid colitis  - resp viral negative   -continue IV cefepime, flagyl. Vanco discontinued   - appreciate ID input   -neutropenic precautions  - fungal cx ordered      #Pancytopenia  Hx Large B cell lymphoma ? per chart review   -heme onc reconsulted [facility]. He follows with Dr. Kandi Leahy  - ID discussed with Dr. Melody Flores- pt just had chemo last week Azacitidine    - his counts are apparently at his baseline   - no need for bone marrow bx     # Anemia   - s/p 1 U PRBC on 7/16, 7/17.   - iron profile ok. 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 Home likely when ready   ----------------------------------------------    CC: Fever     S: Patient is seen and examined at bedside this AM. He feels ok. Waiting for bone marrow bx today. Discussed with nursing and ID Dr. Gerson Cedeño     Per discussion with Dr. Melody Flores by ID - pt had recent bone marrow bx, on chemo last week and frequent blood transfusions. Review of Systems:  A comprehensive review of systems was negative.     O:  Visit Vitals  BP (!) 114/57 (BP 1 Location: Right upper arm, BP Patient Position: At rest)   Pulse 79   Temp 98.2 °F (36.8 °C)   Resp 16   Ht 5' 9\" (1.753 m)   Wt 86.8 kg (191 lb 5.8 oz)   SpO2 97%   BMI 28.26 kg/m²       PHYSICAL EXAM:  Gen: no distress, elderly, chronically ill   HEENT: anicteric sclerae, normal conjunctiva, oropharynx clear, MM moist  Neck: supple, trachea midline, no adenopathy  Heart: RRR, no MRG, no JVD, no peripheral edema  Lungs: CTA b/l, non-labored respirations  Abd: soft, NT, ND, BS+, no organomegaly  Extr: warm  Skin: dry, no rash  Neuro: CN II-XII grossly intact, normal speech, moves all extremities  Psych: normal mood, appropriate affect      Intake/Output Summary (Last 24 hours) at 7/18/2022 1146  Last data filed at 7/17/2022 1425  Gross per 24 hour   Intake 368.3 ml   Output --   Net 368.3 ml        Recent labs & imaging reviewed:  Recent Results (from the past 24 hour(s))   CBC WITH AUTOMATED DIFF    Collection Time: 07/18/22  1:26 AM   Result Value Ref Range    WBC 1.0 (L) 4.1 - 11.1 K/uL    RBC 2.75 (L) 4.10 - 5.70 M/uL    HGB 7.7 (L) 12.1 - 17.0 g/dL    HCT 22.4 (L) 36.6 - 50.3 %    MCV 81.5 80.0 - 99.0 FL    MCH 28.0 26.0 - 34.0 PG    MCHC 34.4 30.0 - 36.5 g/dL    RDW 17.2 (H) 11.5 - 14.5 %    PLATELET 11 (LL) 645 - 400 K/uL    MPV ABNORMAL 8.9 - 12.9 FL    NRBC 0.0 0  WBC    ABSOLUTE NRBC 0.00 0.00 - 0.01 K/uL    NEUTROPHILS 8 (L) 32 - 75 %    LYMPHOCYTES 75 (H) 12 - 49 %    MONOCYTES 17 (H) 5 - 13 %    EOSINOPHILS 0 0 - 7 %    BASOPHILS 0 0 - 1 %    IMMATURE GRANULOCYTES 0 %    ABS. NEUTROPHILS 0.1 (L) 1.8 - 8.0 K/UL    ABS. LYMPHOCYTES 0.7 (L) 0.8 - 3.5 K/UL    ABS. MONOCYTES 0.2 0.0 - 1.0 K/UL    ABS. EOSINOPHILS 0.0 0.0 - 0.4 K/UL    ABS. BASOPHILS 0.0 0.0 - 0.1 K/UL    ABS. IMM.  GRANS. 0.0 K/UL    DF MANUAL      RBC COMMENTS ANISOCYTOSIS  1+        RBC COMMENTS TEARDROP CELLS  PRESENT        RBC COMMENTS SCHISTOCYTES  PRESENT        WBC COMMENTS ATYPICAL LYMPHOCYTES PRESENT     METABOLIC PANEL, BASIC    Collection Time: 07/18/22  1:26 AM   Result Value Ref Range    Sodium 139 136 - 145 mmol/L    Potassium 3.6 3.5 - 5.1 mmol/L    Chloride 106 97 - 108 mmol/L    CO2 26 21 - 32 mmol/L    Anion gap 7 5 - 15 mmol/L    Glucose 103 (H) 65 - 100 mg/dL    BUN 10 6 - 20 MG/DL    Creatinine 0.73 0.70 - 1.30 MG/DL    BUN/Creatinine ratio 14 12 - 20      GFR est AA >60 >60 ml/min/1.73m2    GFR est non-AA >60 >60 ml/min/1.73m2    Calcium 8.1 (L) 8.5 - 10.1 MG/DL     Recent Labs     07/18/22 0126 07/17/22  0327   WBC 1.0* 1.0*   HGB 7.7* 6.5*   HCT 22.4* 18.8*   PLT 11* 10*     Recent Labs     07/18/22  0126 07/17/22  0327 07/16/22  1027    137 136   K 3.6 3.2* 3.6    106 103   CO2 26 25 28   BUN 10 11 14   CREA 0.73 0.75 0.87   * 111* 111*   CA 8.1* 8.2* 8.3*     Recent Labs     07/15/22  1838   ALT 12      TBILI 2.7*   TP 7.9   ALB 3.6   GLOB 4.3*     No results for input(s): INR, PTP, APTT, INREXT, INREXT in the last 72 hours. Recent Labs     07/15/22  2324   TIBC 200*   PSAT 55*   FERR 3,336*      Lab Results   Component Value Date/Time    Folate 13.1 07/16/2022 06:22 PM      No results for input(s): PH, PCO2, PO2 in the last 72 hours. No results for input(s): CPK, CKNDX, TROIQ in the last 72 hours.     No lab exists for component: CPKMB  No results found for: CHOL, CHOLX, CHLST, CHOLV, HDL, HDLP, LDL, LDLC, DLDLP, TGLX, TRIGL, TRIGP, CHHD, CHHDX  No results found for: GLUCPOC  No results found for: COLOR, APPRN, SPGRU, REFSG, SUDHAKAR, PROTU, GLUCU, KETU, BILU, UROU, MILDRED, LEUKU, GLUKE, EPSU, BACTU, WBCU, RBCU, CASTS, UCRY    Med list reviewed  Current Facility-Administered Medications   Medication Dose Route Frequency    [START ON 7/19/2022] Vancomycin random 7/19 w/ AM labs; please draw AFTER 0400   Other ONCE    0.9% sodium chloride infusion 250 mL  250 mL IntraVENous PRN    0.9% sodium chloride infusion 250 mL  250 mL IntraVENous PRN    cefepime (MAXIPIME) 2 g in 0.9% sodium chloride (MBP/ADV) 100 mL MBP  2 g IntraVENous Q8H    0.9% sodium chloride infusion  100 mL/hr IntraVENous CONTINUOUS    metroNIDAZOLE (FLAGYL) IVPB premix 500 mg  500 mg IntraVENous Q8H    0.9% sodium chloride infusion 250 mL  250 mL IntraVENous PRN    sodium chloride (NS) flush 5-10 mL  5-10 mL IntraVENous PRN    acetaminophen (TYLENOL) tablet 1,000 mg  1,000 mg Oral Q6H PRN       Care Plan discussed with:  Patient/Family and Nurse    Felicity Steele MD  Internal Medicine  Date of Service: 7/18/2022

## 2022-07-18 NOTE — PROGRESS NOTES
OCCUPATIONAL THERAPY EVALUATION/DISCHARGE  Patient: Rd Avina (80 y.o. male)  Date: 7/18/2022  Primary Diagnosis: Neutropenic fever ([address] [D70.9, R50.81]       Precautions:       ASSESSMENT  Based on the objective data described below, the patient presents at modified independent-independent level for ADLs and transfers following admission for neutropenic fever. At baseline he lives with his son and is independent with ADLs and functional mobility. He was received seated EOB, agreeable to participate. Pt performed LB dressing ADL with mod I in tailor sitting. He ambulated in room, performed toilet transfer and toileting, and standing grooming ADL with mod I, no LOB observed. Pt transferred to chair at end of session with needs met, VSS throughout. He had no further questions or concerns for acute OT, feels he is near his functional baseline for ADLs and transfers. Anticipate no further OT needs at discharge. Current Level of Function (ADLs/self-care): Mod I-Independent    Functional Outcome Measure: The patient scored 90/100 on the Barthel Index outcome measure. Other factors to consider for discharge: lives with son     PLAN :    Recommendation for discharge: (in order for the patient to meet his/her long term goals)  No skilled occupational therapy/ follow up rehabilitation needs identified at this time. This discharge recommendation:  Has been made in collaboration with the attending provider and/or case management    IF patient discharges home will need the following DME: none       SUBJECTIVE:   Patient stated I feel ok.     OBJECTIVE DATA SUMMARY:   HISTORY:   Past Medical History:   Diagnosis Date    Cancer ([address]     PROSTATE    Hypertension      Past Surgical History:   Procedure Laterality Date    HX TONSILLECTOMY      HX UROLOGICAL      PROSTATECTOMY    CO ABDOMEN SURGERY PROC UNLISTED      STONES GALLBLADDER       Prior Level of Function/Environment/Context: Lives with son, independent with ADLs and functional mobility. Expanded or extensive additional review of patient history:   Home Situation  Home Environment: Private residence  # Steps to Enter: 3  Rails to Enter: Yes  Hand Rails : Bilateral  One/Two Story Residence: One story  Living Alone: No  Support Systems: Child(comfort)  Patient Expects to be Discharged to[de-identified] Home with family assistance  Current DME Used/Available at Home: None    Hand dominance: Right    EXAMINATION OF PERFORMANCE DEFICITS:  Cognitive/Behavioral Status:  Neurologic State: Alert          Vision/Perceptual:           Acuity: Within Defined Limits         Range of Motion:  AROM: Within functional limits  PROM: Within functional limits       Strength:  Strength: Within functional limits       Coordination:  Coordination: Within functional limits  Fine Motor Skills-Upper: Left Intact; Right Intact    Gross Motor Skills-Upper: Left Intact; Right Intact      Balance:  Sitting: Intact  Standing: Intact    Functional Mobility and Transfers for ADLs:  Bed Mobility:  Supine to Sit: Modified independent    Transfers:  Sit to Stand: Modified independent  Stand to Sit: Modified independent  Bed to Chair: Modified independent    ADL Assessment:  Feeding: Independent    Oral Facial Hygiene/Grooming: Modified Independent (standing)    Bathing: Modified independent    Type of Bath: Basin/Soap/Water    Upper Body Dressing: Independent    Lower Body Dressing: Modified independent    Toileting: Modified independent       ADL Intervention and task modifications:       Grooming  Position Performed: Standing  Washing Hands: Modified independent         Lower Body Dressing Assistance  Socks: Modified independent  Leg Crossed Method Used: Yes  Position Performed: Seated edge of bed    Toileting  Bladder Hygiene: Modified independent     Functional Measure:    Barthel Index:  Bathin  Bladder: 10  Bowels: 10  Groomin  Dressing: 10  Feeding: 10  Mobility: 15  Stairs: 5  Toilet Use: 10  Transfer (Bed to Chair and Back): 15  Total: 90/100      The Barthel ADL Index: Guidelines  1. The index should be used as a record of what a patient does, not as a record of what a patient could do. 2. The main aim is to establish degree of independence from any help, physical or verbal, however minor and for whatever reason. 3. The need for supervision renders the patient not independent. 4. A patient's performance should be established using the best available evidence. Asking the patient, friends/relatives and nurses are the usual sources, but direct observation and common sense are also important. However direct testing is not needed. 5. Usually the patient's performance over the preceding 24-48 hours is important, but occasionally longer periods will be relevant. 6. Middle categories imply that the patient supplies over 50 per cent of the effort. 7. Use of aids to be independent is allowed. Score Interpretation (from [address] [address])    Independent   60-79 Minimally independent   40-59 Partially dependent   20-39 Very dependent   <20 Totally dependent     -Guillermo Bailon., Barthel, D.W. (1965). Functional evaluation: the Barthel Index. [facility] ([address] (1997). The Barthel activities of daily living index: self-reporting versus actual performance in the old (> or = 75 years). Journal of [address] [place] [address] 45(7), 14 [facility], J.DANNY, Selin Sheridan., Ezekiel Alejo. (1999). Measuring the change in disability after inpatient rehabilitation; comparison of the responsiveness of the Barthel Index and Functional [place] Measure. Journal of Neurology, Neurosurgery, and Psychiatry, 66(4), 571-246. Cinthya Guajardo NSHENA.MYNOR, REZA Hernandez, & María Mc MElderA. (2004) Assessment of post-stroke quality of life in cost-effectiveness studies: The usefulness of the Barthel Index and the EuroQoL-5D.  [facility], 13, 513-39 Occupational Therapy Evaluation Charge Determination   History Examination Decision-Making   LOW Complexity : Brief history review  LOW Complexity : 1-3 performance deficits relating to physical, cognitive , or psychosocial skils that result in activity limitations and / or participation restrictions  LOW Complexity : No comorbidities that affect functional and no verbal or physical assistance needed to complete eval tasks       Based on the above components, the patient evaluation is determined to be of the following complexity level: LOW   Pain Rating:  Pt reported no pain during session    Activity Tolerance:   Good    After treatment patient left in no apparent distress:    Sitting in chair and Call bell within reach    COMMUNICATION/EDUCATION:   The patients plan of care was discussed with: Physical therapist and Registered nurse.      Thank you for this referral.  Renu Ritter OT  Time Calculation: 17 mins

## 2022-07-18 NOTE — PROGRESS NOTES
Clinical Pharmacy Note: Metronidazole Dosing    Please note that the metronidazole dose for Marely Patches has been changed to 500 mg IV q12h per [facility]-approved protocol. Please contact the pharmacy with any questions.     NELSON LancasterD

## 2022-07-18 NOTE — PROGRESS NOTES
Discussed neutropenic precautions with pt. Provided pt literature on neutropenia. Pt denies questions at this time.

## 2022-07-18 NOTE — PROGRESS NOTES
Bedside shift change report given to [address]  (oncoming nurse) by Ely Álvarez RN  (offgoing nurse). Report included the following information SBAR, Kardex, MAR and Recent Results.

## 2022-07-19 VITALS
HEIGHT: 69 IN | HEART RATE: 86 BPM | DIASTOLIC BLOOD PRESSURE: 72 MMHG | BODY MASS INDEX: 28.51 KG/M2 | OXYGEN SATURATION: 98 % | RESPIRATION RATE: 16 BRPM | TEMPERATURE: 97.2 F | SYSTOLIC BLOOD PRESSURE: 131 MMHG | WEIGHT: 192.5 LBS

## 2022-07-19 LAB
BASOPHILS # BLD: 0 K/UL (ref 0–0.1)
BASOPHILS NFR BLD: 0 % (ref 0–1)
DIFFERENTIAL METHOD BLD: ABNORMAL
EOSINOPHIL # BLD: 0 K/UL (ref 0–0.4)
EOSINOPHIL NFR BLD: 0 % (ref 0–7)
ERYTHROCYTE [DISTWIDTH] IN BLOOD BY AUTOMATED COUNT: 17.5 % (ref 11.5–14.5)
HCT VFR BLD AUTO: 22.1 % (ref 36.6–50.3)
HGB BLD-MCNC: 7.7 G/DL (ref 12.1–17)
IMM GRANULOCYTES # BLD AUTO: 0 K/UL
IMM GRANULOCYTES NFR BLD AUTO: 0 %
LYMPHOCYTES # BLD: 0.5 K/UL (ref 0.8–3.5)
LYMPHOCYTES NFR BLD: 70 % (ref 12–49)
MCH RBC QN AUTO: 28.1 PG (ref 26–34)
MCHC RBC AUTO-ENTMCNC: 34.8 G/DL (ref 30–36.5)
MCV RBC AUTO: 80.7 FL (ref 80–99)
MONOCYTES # BLD: 0.2 K/UL (ref 0–1)
MONOCYTES NFR BLD: 20 % (ref 5–13)
NEUTS SEG # BLD: 0.1 K/UL (ref 1.8–8)
NEUTS SEG NFR BLD: 10 % (ref 32–75)
NRBC # BLD: 0 K/UL (ref 0–0.01)
NRBC BLD-RTO: 0 PER 100 WBC
PLATELET # BLD AUTO: 9 K/UL (ref 150–400)
RBC # BLD AUTO: 2.74 M/UL (ref 4.1–5.7)
RBC MORPH BLD: ABNORMAL
WBC # BLD AUTO: 0.8 K/UL (ref 4.1–11.1)
WBC MORPH BLD: ABNORMAL

## 2022-07-19 PROCEDURE — P9073 PLATELETS PHERESIS PATH REDU: HCPCS

## 2022-07-19 PROCEDURE — 36430 TRANSFUSION BLD/BLD COMPNT: CPT

## 2022-07-19 PROCEDURE — 85025 COMPLETE CBC W/AUTO DIFF WBC: CPT

## 2022-07-19 PROCEDURE — 74011250636 HC RX REV CODE- 250/636: Performed by: INTERNAL MEDICINE

## 2022-07-19 PROCEDURE — 74011250636 HC RX REV CODE- 250/636: Performed by: FAMILY MEDICINE

## 2022-07-19 PROCEDURE — 36415 COLL VENOUS BLD VENIPUNCTURE: CPT

## 2022-07-19 PROCEDURE — 99232 SBSQ HOSP IP/OBS MODERATE 35: CPT | Performed by: INTERNAL MEDICINE

## 2022-07-19 PROCEDURE — 74011000258 HC RX REV CODE- 258: Performed by: FAMILY MEDICINE

## 2022-07-19 RX ORDER — SODIUM CHLORIDE 9 MG/ML
250 INJECTION, SOLUTION INTRAVENOUS AS NEEDED
Status: DISCONTINUED | OUTPATIENT
Start: 2022-07-19 | End: 2022-07-19 | Stop reason: HOSPADM

## 2022-07-19 RX ORDER — OFLOXACIN 3 MG/ML
SOLUTION/ DROPS OPHTHALMIC
COMMUNITY
Start: 2022-06-29

## 2022-07-19 RX ORDER — ACYCLOVIR 400 MG/1
400 TABLET ORAL 2 TIMES DAILY
COMMUNITY
Start: 2022-06-27

## 2022-07-19 RX ORDER — PREDNISOLONE ACETATE 10 MG/ML
SUSPENSION/ DROPS OPHTHALMIC
COMMUNITY
Start: 2022-06-29

## 2022-07-19 RX ORDER — KETOROLAC TROMETHAMINE 5 MG/ML
SOLUTION OPHTHALMIC
COMMUNITY
Start: 2022-06-29

## 2022-07-19 RX ORDER — LEVOFLOXACIN 500 MG/1
500 TABLET, FILM COATED ORAL DAILY
COMMUNITY
Start: 2022-06-27

## 2022-07-19 RX ADMIN — METRONIDAZOLE 500 MG: 500 INJECTION, SOLUTION INTRAVENOUS at 02:54

## 2022-07-19 RX ADMIN — CEFEPIME 2 G: 2 INJECTION, POWDER, FOR SOLUTION INTRAVENOUS at 00:25

## 2022-07-19 RX ADMIN — SODIUM CHLORIDE 100 ML/HR: 900 INJECTION, SOLUTION INTRAVENOUS at 06:38

## 2022-07-19 RX ADMIN — CEFEPIME 2 G: 2 INJECTION, POWDER, FOR SOLUTION INTRAVENOUS at 08:40

## 2022-07-19 NOTE — PROGRESS NOTES
0000: Bedside shift change report given to Eliza Marin (oncoming nurse) by Soco Eric (offgoing nurse). Report included the following information SBAR, Kardex, MAR and Cardiac Rhythm NSR.     0400: Critical lab results low platelets and WBC reported via [employer] to provider. 0500: Platelets ordered to tranfuse. 5768: Transfusion complete  0800: Bedside shift change report given to Larissa Armando (oncoming nurse) by Eliza Marin (offgoing nurse). Report included the following information SBAR, Kardex, MAR and Cardiac Rhythm NSR.

## 2022-07-19 NOTE — DISCHARGE SUMMARY
Discharge Summary     Patient:  Ermias Valdivia       MRN: [id number]       YOB: 1940       Age: [de-identified] y.o. Date of admission:  7/15/2022    Date of discharge:  7/19/2022    Primary care provider: Dr. Yony Olivera DO    Admitting provider:  Payal Feliciano MD    Discharging provider:  Gabe MonDylan flores 91.: [phone]. If unavailable, call [phone] and ask the  to page the triage hospitalist.    Consultations  · IP CONSULT TO ONCOLOGY  · IP CONSULT TO INFECTIOUS DISEASES  · IP CONSULT TO ONCOLOGY    Procedures  · * No surgery found *    Discharge destination: Home. The patient is stable for discharge. Admission diagnosis  · Neutropenic fever ([facility] [D70.9, R50.81]    Current Discharge Medication List      CONTINUE these medications which have NOT CHANGED    Details   levoFLOXacin (LEVAQUIN) 500 mg tablet Take 500 mg by mouth daily. ofloxacin (FLOXIN) 0.3 % ophthalmic solution INSTILL 1 DROP 4 TIMES EVERY DAY INTO SURGERY EYE, START 2 DAYS PRIOR TO SURGERY      acyclovir (ZOVIRAX) 400 mg tablet Take 400 mg by mouth two (2) times a day.      ketorolac (ACULAR) 0.5 % ophthalmic solution INSTILL 1 DROP INTO EYE HAVING SURGERY 4 TIMES A DAY STARTING 2 DAYS PRIOR TO SURGERY      prednisoLONE acetate (PRED FORTE) 1 % ophthalmic suspension INSTILL 1 DROP 4 TIMES A DAY INTO EYE AFTER SURGERY FOR 1 WEEK THEN TAPER AS DIRECTED      senna-docusate (Senokot-S) 8.6-50 mg per tablet Take 1 Tablet by mouth two (2) times a day. polyethylene glycol (Miralax) 17 gram/dose powder Take 17 g by mouth two (2) times a day. OTHER Testosterone shot twice a month       triamterene-hydrochlorothiazide (MAXZIDE) 37.5-25 mg per tablet Take 1 Tab by mouth daily. tadalafil (CIALIS) 5 mg tablet Take 5 mg by mouth as needed.                Follow-up Information     Follow up With Specialties Details Why Contact Info    Rafael Malcolm DO Family Medicine In 1 week  [facility]  [address]      Shy Cox MD Hematology and Oncology In 1 week  [address] [address]  [phone]            Final discharge diagnoses and brief hospital course  Edgardo Perez is a [de-identified] y. o. male with a pmhx prostate cancer, and hypertension who presents with  Fever, and chills.  His sx started after receiveing a blood transfusion.      In the ED,   He was fevrile to 102.9, tachycardic to 125, with low normal BP, and tachypea to 33.  Labs were significant. In the ED, he received tylenol, cefepime, vancomycin, levaquin, cefepime, and 2Lns    #Septic shock on poa - resolved   #Neutropenic fever due to sigmoid colitis ?  - blood cx NGTD . Normal lactic   - Ua/Ucx ordered   - CT chest/ abd : Sigmoid colitis  - resp viral negative   -on  IV cefepime, flagyl. Vanco discontinued   - appreciate ID input   -neutropenic precautions  - fungal cx pending     Long discussion with ID Dr. Tim Lafleur - chronically neutropenic. He is already on Levaquin and Acyclovir for ppx . No source of infection identified for neutropenic fever. Will continue the same antibiotics. Deferred antifungal ppx to oncology team as outpt      #Pancytopenia - baseline   Hx MDS   - follows with Dr. Kaye Lino  - on chemo Azacitidine  last week  -  bone marrow bx done in 5/22     # Anemia   - s/p 1 U PRBC on 7/16, 7/17.   - iron profile ok. 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  He understood and agreed     Physical examination at discharge  Visit Vitals  BP 131/72 (BP 1 Location: Right upper arm, BP Patient Position: At rest)   Pulse 82   Temp 97.2 °F (36.2 °C)   Resp 16   Ht 5' 9\" (1.753 m)   Wt 87.3 kg (192 lb 8 oz)   SpO2 98%   BMI 28.43 kg/m²     Gen: no distress, elderly, chronically ill   HEENT: anicteric sclerae, normal conjunctiva, oropharynx clear, MM moist  Neck: supple, trachea midline, no adenopathy  Heart: RRR, no MRG, no JVD, no peripheral edema  Lungs: CTA b/l, non-labored respirations  Abd: soft, NT, ND, BS+, no organomegaly  Extr: warm  Skin: dry, no rash  Neuro: CN II-XII grossly intact, normal speech, moves all extremities  Psych: normal mood, appropriate affect    Pertinent imaging studies:    Per EMR     Recent Labs     07/19/22  0307 07/18/22  0126 07/17/22  0327   WBC 0.8* 1.0* 1.0*   HGB 7.7* 7.7* 6.5*   HCT 22.1* 22.4* 18.8*   PLT 9* 11* 10*     Recent Labs     07/18/22  0126 07/17/22  0327    137   K 3.6 3.2*    106   CO2 26 25   BUN 10 11   CREA 0.73 0.75   * 111*   CA 8.1* 8.2*     No results for input(s): AP, TBIL, TP, ALB, GLOB, GGT, AML, LPSE in the last 72 hours. No lab exists for component: SGOT, GPT, AMYP, HLPSE  No results for input(s): INR, PTP, APTT, INREXT in the last 72 hours. No results for input(s): FE, TIBC, PSAT, FERR in the last 72 hours. No results for input(s): PH, PCO2, PO2 in the last 72 hours. No results for input(s): CPK, CKMB in the last 72 hours. No lab exists for component: TROPONINI  No components found for: [place]    Chronic Diagnoses:    Problem List as of 7/19/2022 Never Reviewed          Codes Class Noted - Resolved    [facility] ([facility] ICD-10-CM: D70.9, R50.81  ICD-9-CM: 288.00, 780.61  7/15/2022 - Present              Time spent on discharge related activities today greater than 30 minutes.       Signed:  Gabe Aviles MD                 Hospitalist, Internal Medicine      Cc: Yony Olivera DO

## 2022-07-19 NOTE — DISCHARGE INSTRUCTIONS
Please bring this form with you to show your primary care provider at your follow-up appointment. Primary care provider:  Dr. Kaden Najera DO    Discharging provider:  Alex Quick MD    You have been admitted to the hospital with the following diagnoses:  · Neutropenic fever ([facility] [address] [D70.9, R50.81]    FOLLOW-UP CARE RECOMMENDATIONS:    APPOINTMENTS:  · Follow-up with primary care provider, Dr. Kaden Najera DO  -  Please call [phone] shortly after discharge to set up an appointment to be seen in  1 week    · Oncology in 1 week    FOLLOW-UP TESTS recommended: cbc in 1 week     PENDING TEST RESULTS:  At the time of your discharge the following test results are still pending: none   Please make sure you review these results with your outpatient follow-up provider(s). SYMPTOMS to watch for: chest pain, shortness of breath, fever, chills, nausea, vomiting, diarrhea, change in mentation, falling, weakness, bleeding. DIET/what to eat:  Regular Diet    ACTIVITY:  Activity as tolerated    What to do if new or unexpected symptoms occur? If you experience any of the above symptoms (or should other concerns or questions arise after discharge) please call your primary care physician. Return to the emergency room if you cannot get hold of your doctor. · It is very important that you keep your follow-up appointment(s). · Please bring discharge papers, medication list (and/or medication bottles) to your follow-up appointments for review by your outpatient provider(s). · Please check the list of medications and be sure it includes every medication (even non-prescription medications) that your provider wants you to take. · It is important that you take the medication exactly as they are prescribed. · Keep your medication in the bottles provided by the pharmacist and keep a list of the medication names, dosages, and times to be taken in your wallet.    · Do not take other medications without consulting your doctor. · If you have any questions about your medications or other instructions, please talk to your nurse or care provider before you leave the hospital.    I understand that if any problems occur once I am at home I am to contact my physician. These instructions were explained to me and I had the opportunity to ask questions. Patient Education     Learning About Eating Safely With a Low White Blood Count  Why do you need to be careful with food? Cancer and its treatments can cause your white blood cell count to drop. This means your immune system is weakened. And when this happens, you are more likely to get an infection. Taking special care with food handling can help you avoid infections. What should you look for when shopping for food? · Buy wrapped, pre-packaged foods, such as breads, bagels, muffins, rolls, cereals, noodles, and rice. Avoid open-bin bulk foods. · Look for foods that are packed as single servings. You will be less likely to have leftovers, which can spoil quickly. · Buy pasteurized foods, such as milk, juice, yogurt, cheese, and other dairy products. Avoid raw products. · Make sure that canned foods are safe. Check for dents and other damage. Don't buy damaged cans. What are safe food-handling practices? · Wash your hands with soap and warm water before and after you handle food. And wash your hands before you eat. · Wash fruits and vegetables before you cut or peel them. · Throw out any fruits or vegetables that are moldy or damaged. · Wash the tops of canned foods with soap and warm water before you open them. · Clean counters and cutting boards with soap and hot water. Or you can use a solution made from 1 tsp bleach and 1 gallon water. · Keep your refrigerator at or below 40º F.  · Thaw meat in the refrigerator. Don't thaw meat on a counter or anywhere at room temperature. What are some safe cooking practices?   · Cook meats, poultry, and fish until they are well done.  · Cook eggs until the yolks and whites are solid. · Don't let foods sit out and reach room temperature. How can you eat safely in restaurants? · Think about eating out at times when restaurants aren't so crowded. When there are fewer people, there are fewer germs. · Choose restaurants that use clean tablecloths or place mats. This keeps your silverware from touching the table. · Choose fully cooked foods rather than raw fruits and vegetables. · Select items from the menu that are made to order, rather than eating from salad bars and buffets. Where can you learn more? Go to [employer]  Enter P806 in the search box to learn more about \"Learning About Eating Safely With a Low White Blood Count. \"   © 9958-8745 [employer]. Care instructions adapted under license by [facility] (which disclaims liability or warranty for this information). This care instruction is for use with your licensed healthcare professional. If you have questions about a medical condition or this instruction, always ask your healthcare professional. [place] [address] any warranty or liability for your use of this information.   Content Version: 01.6.259138; Current as of: November 20, 2015

## 2022-07-19 NOTE — PROGRESS NOTES
Infectious Disease Progress Note          HPI:  Mr. Marlene Woodward seen  Denied bleeding sympotms  No more fever or chills  Says had soft stool and not really diarrhea today  No other symptoms           Physical Exam:    Vitals:   Patient Vitals for the past 24 hrs:   Temp Pulse Resp BP SpO2   07/19/22 1246 97.2 °F (36.2 °C) 82 16 131/72 98 %   07/19/22 1200 -- 75 -- -- --   07/19/22 1000 -- 96 -- -- --   07/19/22 0821 98.7 °F (37.1 °C) 83 15 116/60 98 %   07/19/22 0633 97.9 °F (36.6 °C) 82 18 (!) 111/59 97 %   07/19/22 0600 -- 80 -- -- --   07/19/22 0520 -- 80 -- (!) 116/56 97 %   07/19/22 0450 97.8 °F (36.6 °C) 87 18 (!) 106/52 93 %   07/19/22 0428 97.7 °F (36.5 °C) 82 18 (!) 102/58 96 %   07/19/22 0400 -- 75 -- -- --   07/19/22 0346 97.7 °F (36.5 °C) 81 17 (!) 114/59 96 %   07/19/22 0200 -- 80 -- -- --   07/19/22 0021 97.8 °F (36.6 °C) 79 18 (!) 103/51 98 %   07/18/22 2200 -- 82 -- -- --   07/18/22 2017 97.5 °F (36.4 °C) 87 17 111/63 95 %   07/18/22 2000 -- 83 -- -- --   07/18/22 1800 -- 95 -- -- --   07/18/22 1511 97.3 °F (36.3 °C) 83 18 111/65 98 %   07/18/22 1400 -- 80 -- -- --     · GEN: NAD  · HEENT:  no scleral icterus,   no thrush  · CV: S1, S2 heard regularly,   · Lungs: Clear to auscultation bilaterally  · Abdomen: soft nontender   · Extremities: no edema  · Neuro: Alert, oriented to self, place and situation, moves all extremities to commands, verbal   · Skin: no rash  ·       Labs:   Recent Results (from the past 24 hour(s))   CULTURE, BLOOD FOR FUNGUS    Collection Time: 07/18/22  6:03 PM    Specimen: Blood   Result Value Ref Range    Special Requests: HOLD 21 DAYS FOR FUNGUS      Culture result: NO GROWTH AFTER 15 HOURS     CBC WITH AUTOMATED DIFF    Collection Time: 07/19/22  3:07 AM   Result Value Ref Range    WBC 0.8 (LL) 4.1 - 11.1 K/uL    RBC 2.74 (L) 4.10 - 5.70 M/uL    HGB 7.7 (L) 12.1 - 17.0 g/dL    HCT 22.1 (L) 36.6 - 50.3 %    MCV 80.7 80.0 - 99.0 FL    MCH 28.1 26.0 - 34.0 PG    MCHC 34.8 30.0 - 36.5 g/dL    RDW 17.5 (H) 11.5 - 14.5 %    PLATELET 9 (LL) 764 - 400 K/uL    NRBC 0.0 0  WBC    ABSOLUTE NRBC 0.00 0.00 - 0.01 K/uL    NEUTROPHILS 10 (L) 32 - 75 %    LYMPHOCYTES 70 (H) 12 - 49 %    MONOCYTES 20 (H) 5 - 13 %    EOSINOPHILS 0 0 - 7 %    BASOPHILS 0 0 - 1 %    IMMATURE GRANULOCYTES 0 %    ABS. NEUTROPHILS 0.1 (L) 1.8 - 8.0 K/UL    ABS. LYMPHOCYTES 0.5 (L) 0.8 - 3.5 K/UL    ABS. MONOCYTES 0.2 0.0 - 1.0 K/UL    ABS. EOSINOPHILS 0.0 0.0 - 0.4 K/UL    ABS. BASOPHILS 0.0 0.0 - 0.1 K/UL    ABS. IMM. GRANS. 0.0 K/UL    DF MANUAL      RBC COMMENTS        Differential performed on buffy coat smear. ANISOCYTOSIS  1+      RBC COMMENTS OVALOCYTES  1+        RBC COMMENTS TEARDROP CELLS  1+        RBC COMMENTS TARGET CELLS  PRESENT        RBC COMMENTS MICROCYTOSIS  1+        WBC COMMENTS REACTIVE LYMPHS     PLATELETS, ALLOCATE    Collection Time: 07/19/22  4:15 AM   Result Value Ref Range    Unit number [id number]     Blood component type PLP,LR PSTC2     Unit division 00     Status of unit ISSUED        Microbiology Data:       Blood: CULTURE, BLOOD  Order: [id number]   Collected 7/15/2022 22:56     Status: Preliminary result    Specimen Information: Blood         0 Result Notes     Ref Range & Units 7/15/22 1066   Special Requests:   NO SPECIAL REQUESTS P    Culture result:   NO GROWTH 3 DAYS [address]                Imaging:   CT CHEST WO CONT: Patient Communication     Add Comments   Not seen       Study Result    Narrative & Impression   INDICATION: Neutropenic fever, looking for infectious source.     COMPARISON: None     CONTRAST: None.     TECHNIQUE:  5 mm axial images were obtained through the chest, abdomen, and  pelvis. Coronal and sagittal reformats were generated.   CT dose reduction was  achieved through use of a standardized protocol tailored for this examination  and automatic exposure control for dose modulation.     The absence of intravenous contrast reduces the sensitivity for evaluation of  the mediastinum, yaritza, vasculature, and abdominal organs.     FINDINGS:     CHEST WALL: No mass or axillary lymphadenopathy. THYROID: No nodule. MEDIASTINUM: No mass or lymphadenopathy. YARITZA: No mass or lymphadenopathy. THORACIC AORTA: Calcified plaque of the thoracic aorta with focal ectasia of the  aortic arch. No aneurysm. MAIN PULMONARY ARTERY: Normal in caliber. TRACHEA/BRONCHI: Patent. ESOPHAGUS: No wall thickening or dilatation. HEART: Scattered coronary calcifications. No pericardial effusion. PLEURA: No effusion or pneumothorax. LUNGS: No evidence of pneumonia. No suspicious lung nodule. ABDOMEN/PELVIS: There is circumferential wall thickening of the sigmoid colon  with surrounding inflammatory fat stranding consistent with colitis. There are  scattered diverticula but the diverticula do not appear to be the source of the  inflammation. The small bowel is normal in caliber. There is no ascites or  lymphadenopathy. 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 There is ectasia of the left common iliac artery measuring 2.1 cm. There are  surgical clips from prostatectomy and pelvic lymph node dissection. Urinary  bladder is normal.     Bilateral simple renal cysts. Nonspecific hepatic hypodensity measuring 13 mm. Splenomegaly measuring 16 cm.     BONES: No destructive bone lesion.     IMPRESSION  1. Sigmoid colitis. No perforation or abscess. 2.  No pulmonary infection. 3.  Nonspecific 13 mm hepatic hypodensity incompletely characterized without  contrast.  4.  Splenomegaly. 5.  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 Assessment / Plan:   Mr. 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 Please contact with questions or concerns.        Karen Dumont,   1:52 PM

## 2022-07-20 LAB
BLD PROD TYP BPU: NORMAL
BPU ID: NORMAL
STATUS OF UNIT,%ST: NORMAL
UNIT DIVISION, %UDIV: 0

## 2022-07-21 LAB
BACTERIA SPEC CULT: NORMAL
SERVICE CMNT-IMP: NORMAL

## 2022-08-08 LAB
BACTERIA SPEC CULT: NORMAL
SERVICE CMNT-IMP: NORMAL